# Patient Record
Sex: FEMALE | Race: WHITE | NOT HISPANIC OR LATINO | Employment: OTHER | URBAN - METROPOLITAN AREA
[De-identification: names, ages, dates, MRNs, and addresses within clinical notes are randomized per-mention and may not be internally consistent; named-entity substitution may affect disease eponyms.]

---

## 2017-10-25 ENCOUNTER — APPOINTMENT (OUTPATIENT)
Dept: CARDIOLOGY | Facility: MEDICAL CENTER | Age: 77
End: 2017-10-25

## 2017-11-09 ENCOUNTER — OFFICE VISIT (OUTPATIENT)
Dept: CARDIOLOGY | Facility: MEDICAL CENTER | Age: 77
End: 2017-11-09
Payer: COMMERCIAL

## 2017-11-09 VITALS
SYSTOLIC BLOOD PRESSURE: 110 MMHG | OXYGEN SATURATION: 98 % | WEIGHT: 164 LBS | DIASTOLIC BLOOD PRESSURE: 80 MMHG | HEART RATE: 60 BPM | BODY MASS INDEX: 26.36 KG/M2 | HEIGHT: 66 IN

## 2017-11-09 DIAGNOSIS — R94.31 ABNORMAL ELECTROCARDIOGRAM: Chronic | ICD-10-CM

## 2017-11-09 DIAGNOSIS — I10 ESSENTIAL HYPERTENSION, BENIGN: ICD-10-CM

## 2017-11-09 DIAGNOSIS — E78.5 DYSLIPIDEMIA: ICD-10-CM

## 2017-11-09 PROCEDURE — 99214 OFFICE O/P EST MOD 30 MIN: CPT | Performed by: INTERNAL MEDICINE

## 2017-11-09 NOTE — LETTER
[facility]   [address]  [place] [address]  Phone: [phone]  Fax: [phone]              Amber Roach  1940    Encounter Date: 11/9/2017    Wellington Mistry M.D.          PROGRESS NOTE:  Subjective:   Amber Roach is a 76 y.o. female who presents today For follow-up of her history of hypertension and dyslipidemia    She's been doing well overall, no specific health concerns    Past Medical History:   Diagnosis Date   • Abnormal blood chemistry 2/8/2012   • Abnormal electrocardiogram 2/8/2012   • Breast cancer (CMS-HCC) 2/8/2012   • Hyperlipidemia 2/8/2012   • Hypertension 2/8/2012   • Vitamin d deficiency 2/8/2012     Past Surgical History:   Procedure Laterality Date   • OTHER      appendectomy 1952 right knee replacement 1995 liver biopsy 2013 hysterectomy 1993 left knee replacement 1997 breast lumpectomy 1994 cholecystectomy 2013     Family History   Problem Relation Age of Onset   • Heart Disease Brother    • Heart Attack Brother      History   Smoking Status   • Never Smoker   Smokeless Tobacco   • Never Used     Allergies   Allergen Reactions   • Amlodipine      hives   • Ampicillin    • Penicillins      Outpatient Encounter Prescriptions as of 11/9/2017   Medication Sig Dispense Refill   • Omega-3 Fatty Acids (FISH OIL CONCENTRATE PO) Take  by mouth.     • valsartan-hydrochlorothiazide (DIOVAN-HCT) 320-12.5 MG per tablet Take 1 Tab by mouth every day.     • Calcium Carbonate-Vitamin D (CALCIUM 500 + D PO) Take  by mouth.     • FOLIC ACID PO Take  by mouth.     • Multiple Vitamins-Minerals (PRESERVISION AREDS 2 PO) Take  by mouth.     • aspirin 81 MG tablet Take 81 mg by mouth every day.     • Calcium Citrate-Vitamin D (CITRACAL + D PO) Take  by mouth every day.     • atorvastatin (LIPITOR) 20 MG TABS Take 20 mg by mouth every day.     • Multiple Vitamin (MULTI-VITAMIN) TABS Take  by mouth every day.     • esomeprazole (NEXIUM)  "40 MG capsule Take 40 mg by mouth every day.     • Cholecalciferol (VITAMIN D) 1000 UNIT CAPS Take  by mouth every day.     • ursodiol (ACTIGALL) 300 MG Cap Take 300 mg by mouth 2 times a day.     • Nebivolol HCl (BYSTOLIC) 20 MG Tab Take 20 mg by mouth every day. 30 Tab 11     No facility-administered encounter medications on file as of 11/9/2017.      Review of Systems   Constitutional: Negative for chills and fever.   HENT: Negative for sore throat.    Eyes: Negative for blurred vision.   Respiratory: Negative for cough and shortness of breath.    Cardiovascular: Negative for chest pain, palpitations, claudication, leg swelling and PND.   Gastrointestinal: Negative for abdominal pain and nausea.   Musculoskeletal: Negative for falls and joint pain.   Skin: Negative for rash.   Neurological: Negative for dizziness, focal weakness and weakness.   Endo/Heme/Allergies: Does not bruise/bleed easily.        Objective:   /80   Pulse 60   Ht 1.664 m (5' 5.51\")   Wt 74.4 kg (164 lb)   SpO2 98%   BMI 26.87 kg/m²      Physical Exam   Constitutional: No distress.   HENT:   Mouth/Throat: Oropharynx is clear and moist.   Eyes: No scleral icterus.   Cardiovascular: Normal rate, regular rhythm and intact distal pulses.  Exam reveals no gallop and no friction rub.    No murmur heard.  Pulmonary/Chest: Effort normal and breath sounds normal. She has no rales.   Abdominal: Bowel sounds are normal. There is no tenderness.   Musculoskeletal: She exhibits no edema.   Neurological: She is alert.   Skin: No rash noted. She is not diaphoretic.   Psychiatric: She has a normal mood and affect.       Assessment:     1. Abnormal electrocardiogram     2. Dyslipidemia     3. Essential hypertension, benign         Medical Decision Making:  Today's Assessment / Status / Plan:     It was my pleasure to meet with Ms. Roach.    She is on proper medicines for her hypertension which is at goal and dyslipidemia again at goal    I will see " Ms. Roach back in 1 year time and encouraged her to follow up with us over the phone or e-mail using my MyChart as issues arise.    It is my pleasure to participate in the care of Ms. Roach.  Please do not hesitate to contact me with questions or concerns.    Wellington Mistry MD PhD Located within [facility]  Cardiologist [facility] for Heart and Vascular Health        Marilyn Nagel M.D.  [address]  [id number]  [facility] [address]  VIA Facsimile: [phone]

## 2017-11-10 ASSESSMENT — ENCOUNTER SYMPTOMS
DIZZINESS: 0
FEVER: 0
WEAKNESS: 0
CLAUDICATION: 0
SORE THROAT: 0
COUGH: 0
ABDOMINAL PAIN: 0
NAUSEA: 0
CHILLS: 0
PALPITATIONS: 0
SHORTNESS OF BREATH: 0
BRUISES/BLEEDS EASILY: 0
BLURRED VISION: 0
FALLS: 0
PND: 0
FOCAL WEAKNESS: 0

## 2017-11-11 NOTE — PROGRESS NOTES
Subjective:   Amber Roach is a 76 y.o. female who presents today For follow-up of her history of hypertension and dyslipidemia    She's been doing well overall, no specific health concerns    Past Medical History:   Diagnosis Date   • Abnormal blood chemistry 2/8/2012   • Abnormal electrocardiogram 2/8/2012   • Breast cancer (CMS-HCC) 2/8/2012   • Hyperlipidemia 2/8/2012   • Hypertension 2/8/2012   • Vitamin d deficiency 2/8/2012     Past Surgical History:   Procedure Laterality Date   • OTHER      appendectomy 1952 right knee replacement 1995 liver biopsy 2013 hysterectomy 1993 left knee replacement 1997 breast lumpectomy 1994 cholecystectomy 2013     Family History   Problem Relation Age of Onset   • Heart Disease Brother    • Heart Attack Brother      History   Smoking Status   • Never Smoker   Smokeless Tobacco   • Never Used     Allergies   Allergen Reactions   • Amlodipine      hives   • Ampicillin    • Penicillins      Outpatient Encounter Prescriptions as of 11/9/2017   Medication Sig Dispense Refill   • Omega-3 Fatty Acids (FISH OIL CONCENTRATE PO) Take  by mouth.     • valsartan-hydrochlorothiazide (DIOVAN-HCT) 320-12.5 MG per tablet Take 1 Tab by mouth every day.     • Calcium Carbonate-Vitamin D (CALCIUM 500 + D PO) Take  by mouth.     • FOLIC ACID PO Take  by mouth.     • Multiple Vitamins-Minerals (PRESERVISION AREDS 2 PO) Take  by mouth.     • aspirin 81 MG tablet Take 81 mg by mouth every day.     • Calcium Citrate-Vitamin D (CITRACAL + D PO) Take  by mouth every day.     • atorvastatin (LIPITOR) 20 MG TABS Take 20 mg by mouth every day.     • Multiple Vitamin (MULTI-VITAMIN) TABS Take  by mouth every day.     • esomeprazole (NEXIUM) 40 MG capsule Take 40 mg by mouth every day.     • Cholecalciferol (VITAMIN D) 1000 UNIT CAPS Take  by mouth every day.     • ursodiol (ACTIGALL) 300 MG Cap Take 300 mg by mouth 2 times a day.     • Nebivolol HCl (BYSTOLIC) 20 MG Tab Take 20 mg by mouth every day. 30  "Tab 11     No facility-administered encounter medications on file as of 11/9/2017.      Review of Systems   Constitutional: Negative for chills and fever.   HENT: Negative for sore throat.    Eyes: Negative for blurred vision.   Respiratory: Negative for cough and shortness of breath.    Cardiovascular: Negative for chest pain, palpitations, claudication, leg swelling and PND.   Gastrointestinal: Negative for abdominal pain and nausea.   Musculoskeletal: Negative for falls and joint pain.   Skin: Negative for rash.   Neurological: Negative for dizziness, focal weakness and weakness.   Endo/Heme/Allergies: Does not bruise/bleed easily.        Objective:   /80   Pulse 60   Ht 1.664 m (5' 5.51\")   Wt 74.4 kg (164 lb)   SpO2 98%   BMI 26.87 kg/m²     Physical Exam   Constitutional: No distress.   HENT:   Mouth/Throat: Oropharynx is clear and moist.   Eyes: No scleral icterus.   Cardiovascular: Normal rate, regular rhythm and intact distal pulses.  Exam reveals no gallop and no friction rub.    No murmur heard.  Pulmonary/Chest: Effort normal and breath sounds normal. She has no rales.   Abdominal: Bowel sounds are normal. There is no tenderness.   Musculoskeletal: She exhibits no edema.   Neurological: She is alert.   Skin: No rash noted. She is not diaphoretic.   Psychiatric: She has a normal mood and affect.       Assessment:     1. Abnormal electrocardiogram     2. Dyslipidemia     3. Essential hypertension, benign         Medical Decision Making:  Today's Assessment / Status / Plan:     It was my pleasure to meet with Ms. Roach.    She is on proper medicines for her hypertension which is at goal and dyslipidemia again at goal    I will see Ms. Roach back in 1 year time and encouraged her to follow up with us over the phone or e-mail using my MyChart as issues arise.    It is my pleasure to participate in the care of Ms. Roach.  Please do not hesitate to contact me with questions or " concerns.    Wellington Mistry MD PhD [facility]  Cardiologist [facility] for Heart and Vascular Health

## 2018-12-17 ENCOUNTER — OFFICE VISIT (OUTPATIENT)
Dept: CARDIOLOGY | Facility: MEDICAL CENTER | Age: 78
End: 2018-12-17
Payer: COMMERCIAL

## 2018-12-17 VITALS
OXYGEN SATURATION: 95 % | HEART RATE: 74 BPM | BODY MASS INDEX: 25.71 KG/M2 | DIASTOLIC BLOOD PRESSURE: 70 MMHG | HEIGHT: 66 IN | WEIGHT: 160 LBS | SYSTOLIC BLOOD PRESSURE: 122 MMHG

## 2018-12-17 DIAGNOSIS — E78.5 DYSLIPIDEMIA: ICD-10-CM

## 2018-12-17 DIAGNOSIS — I10 ESSENTIAL HYPERTENSION, BENIGN: ICD-10-CM

## 2018-12-17 PROCEDURE — 99214 OFFICE O/P EST MOD 30 MIN: CPT | Performed by: INTERNAL MEDICINE

## 2018-12-17 RX ORDER — CARVEDILOL PHOSPHATE 40 MG/1
40 CAPSULE, EXTENDED RELEASE ORAL DAILY
Qty: 30 CAP | Refills: 11 | Status: SHIPPED | OUTPATIENT
Start: 2018-12-17 | End: 2021-01-28

## 2018-12-17 RX ORDER — CARVEDILOL 12.5 MG/1
12.5 TABLET ORAL 2 TIMES DAILY WITH MEALS
Refills: 5 | COMMUNITY
Start: 2018-12-14 | End: 2021-05-06 | Stop reason: SDUPTHER

## 2018-12-17 RX ORDER — HYDRALAZINE HYDROCHLORIDE 25 MG/1
25 TABLET, FILM COATED ORAL 3 TIMES DAILY PRN
Qty: 30 TAB | Refills: 11 | Status: SHIPPED | OUTPATIENT
Start: 2018-12-17 | End: 2021-01-28

## 2018-12-17 ASSESSMENT — ENCOUNTER SYMPTOMS
DIZZINESS: 0
FEVER: 0
FALLS: 0
ABDOMINAL PAIN: 0
COUGH: 0
NAUSEA: 0
BRUISES/BLEEDS EASILY: 0
SORE THROAT: 0
WEAKNESS: 0
FOCAL WEAKNESS: 0
SHORTNESS OF BREATH: 0
PALPITATIONS: 0
CLAUDICATION: 0
PND: 0
BLURRED VISION: 0
CHILLS: 0

## 2018-12-17 NOTE — PROGRESS NOTES
Chief Complaint   Patient presents with   • HTN (Controlled)       Subjective:   Amber Roach is a 78 y.o. female who presents today for follow-up of her history of hypertension dyslipidemia    She has had some struggles this year with her blood pressure she knows has high blood pressure when she gets dizziness so she has done better with carvedilol rather than bystolic.          Past Medical History:   Diagnosis Date   • Abnormal blood chemistry 2/8/2012   • Abnormal electrocardiogram 2/8/2012   • Breast cancer (HCC) 2/8/2012   • Hyperlipidemia 2/8/2012   • Hypertension 2/8/2012   • Vitamin d deficiency 2/8/2012     Past Surgical History:   Procedure Laterality Date   • OTHER      appendectomy 1952 right knee replacement 1995 liver biopsy 2013 hysterectomy 1993 left knee replacement 1997 breast lumpectomy 1994 cholecystectomy 2013     Family History   Problem Relation Age of Onset   • Heart Disease Brother    • Heart Attack Brother      Social History     Social History   • Marital status:      Spouse name: N/A   • Number of children: N/A   • Years of education: N/A     Occupational History   • Not on file.     Social History Main Topics   • Smoking status: Never Smoker   • Smokeless tobacco: Never Used   • Alcohol use Yes   • Drug use: No   • Sexual activity: Not on file     Other Topics Concern   • Not on file     Social History Narrative   • No narrative on file     Allergies   Allergen Reactions   • Amlodipine      hives   • Ampicillin    • Penicillins      Outpatient Encounter Prescriptions as of 12/17/2018   Medication Sig Dispense Refill   • carvedilol (COREG) 12.5 MG Tab 12.5 mg 2 times a day, with meals. TAKE 1 TABLET BY MOUTH TWICE A DAY  5   • Carvedilol Phosphate 40 MG CAPSULE SR 24 HR Take 1 Cap by mouth every day. 30 Cap 11   • hydrALAZINE (APRESOLINE) 25 MG Tab Take 1 Tab by mouth 3 times a day as needed (SBP >170). 30 Tab 11   • Omega-3 Fatty Acids (FISH OIL CONCENTRATE PO) Take  by mouth.   "   • valsartan-hydrochlorothiazide (DIOVAN-HCT) 320-12.5 MG per tablet Take 1 Tab by mouth every day.     • FOLIC ACID PO Take  by mouth.     • Multiple Vitamins-Minerals (PRESERVISION AREDS 2 PO) Take  by mouth.     • aspirin 81 MG tablet Take 81 mg by mouth every day.     • Calcium Citrate-Vitamin D (CITRACAL + D PO) Take  by mouth every day.     • atorvastatin (LIPITOR) 20 MG TABS Take 20 mg by mouth every day.     • Multiple Vitamin (MULTI-VITAMIN) TABS Take  by mouth every day.     • esomeprazole (NEXIUM) 40 MG capsule Take 40 mg by mouth every day.     • Cholecalciferol (VITAMIN D) 1000 UNIT CAPS Take  by mouth every day.     • ursodiol (ACTIGALL) 300 MG Cap Take 300 mg by mouth 2 times a day.     • Calcium Carbonate-Vitamin D (CALCIUM 500 + D PO) Take  by mouth.     • [DISCONTINUED] Nebivolol HCl (BYSTOLIC) 20 MG Tab Take 20 mg by mouth every day. 30 Tab 11     No facility-administered encounter medications on file as of 12/17/2018.      Review of Systems   Constitutional: Negative for chills and fever.   HENT: Negative for sore throat.    Eyes: Negative for blurred vision.   Respiratory: Negative for cough and shortness of breath.    Cardiovascular: Negative for chest pain, palpitations, claudication, leg swelling and PND.   Gastrointestinal: Negative for abdominal pain and nausea.   Musculoskeletal: Negative for falls and joint pain.   Skin: Negative for rash.   Neurological: Negative for dizziness, focal weakness and weakness.   Endo/Heme/Allergies: Does not bruise/bleed easily.        Objective:   /70 (BP Location: Left arm, Patient Position: Sitting)   Pulse 74   Ht 1.664 m (5' 5.5\")   Wt 72.6 kg (160 lb)   SpO2 95%   BMI 26.22 kg/m²     Physical Exam   Constitutional: No distress.   HENT:   Mouth/Throat: Oropharynx is clear and moist. No oropharyngeal exudate.   Eyes: No scleral icterus.   Neck: No JVD present.   Cardiovascular: Normal rate and normal heart sounds.  Exam reveals no gallop and " no friction rub.    No murmur heard.  Pulmonary/Chest: No respiratory distress. She has no wheezes. She has no rales.   Abdominal: Soft. Bowel sounds are normal.   Musculoskeletal: She exhibits no edema.   Neurological: She is alert.   Skin: No rash noted. She is not diaphoretic.   Psychiatric: She has a normal mood and affect.     Reviewed her labs from [facility] which show well-controlled dyslipidemia LDL was 83 HDL 85 chemistry otherwise normal except for sodium 133  Assessment:     1. Dyslipidemia     2. Essential hypertension, benign         Medical Decision Making:  Today's Assessment / Status / Plan:     It was my pleasure to meet with Ms. Roach.    It was my pleasure to meet with Ms. Roach.    I gave her a prescription for carvedilol CR 40 mg she prefer daily dosing hopefully is covered by the insurance    Gave her a prescription for hydralazine as needed for elevated blood pressures this hopefully provides reassurance    She is on appropriate statin.      I will see Ms. Roach back in 1 year time and encouraged her to follow up with us over the phone or e-mail using my MyChart as issues arise.    It is my pleasure to participate in the care of Ms. Roach.  Please do not hesitate to contact me with questions or concerns.    Wellington Mistry MD PhD FAC  Cardiologist [facility] for Heart and Vascular Health

## 2018-12-17 NOTE — LETTER
[facility]   [address]   [address]   [place] [address]  Phone: [phone]  Fax: [phone]              Amber Roach  1940    Encounter Date: 12/17/2018    Wellington Mistry M.D.          PROGRESS NOTE:  Chief Complaint   Patient presents with   • HTN (Controlled)       Subjective:   Amber Roach is a 78 y.o. female who presents today for follow-up of her history of hypertension dyslipidemia    She has had some struggles this year with her blood pressure she knows has high blood pressure when she gets dizziness so she has done better with carvedilol rather than bystolic.          Past Medical History:   Diagnosis Date   • Abnormal blood chemistry 2/8/2012   • Abnormal electrocardiogram 2/8/2012   • Breast cancer (HCC) 2/8/2012   • Hyperlipidemia 2/8/2012   • Hypertension 2/8/2012   • Vitamin d deficiency 2/8/2012     Past Surgical History:   Procedure Laterality Date   • OTHER      appendectomy 1952 right knee replacement 1995 liver biopsy 2013 hysterectomy 1993 left knee replacement 1997 breast lumpectomy 1994 cholecystectomy 2013     Family History   Problem Relation Age of Onset   • Heart Disease Brother    • Heart Attack Brother      Social History     Social History   • Marital status:      Spouse name: N/A   • Number of children: N/A   • Years of education: N/A     Occupational History   • Not on file.     Social History Main Topics   • Smoking status: Never Smoker   • Smokeless tobacco: Never Used   • Alcohol use Yes   • Drug use: No   • Sexual activity: Not on file     Other Topics Concern   • Not on file     Social History Narrative   • No narrative on file     Allergies   Allergen Reactions   • Amlodipine      hives   • Ampicillin    • Penicillins      Outpatient Encounter Prescriptions as of 12/17/2018   Medication Sig Dispense Refill   • carvedilol (COREG) 12.5 MG Tab 12.5 mg 2 times a day, with meals.  "TAKE 1 TABLET BY MOUTH TWICE A DAY  5   • Carvedilol Phosphate 40 MG CAPSULE SR 24 HR Take 1 Cap by mouth every day. 30 Cap 11   • hydrALAZINE (APRESOLINE) 25 MG Tab Take 1 Tab by mouth 3 times a day as needed (SBP >170). 30 Tab 11   • Omega-3 Fatty Acids (FISH OIL CONCENTRATE PO) Take  by mouth.     • valsartan-hydrochlorothiazide (DIOVAN-HCT) 320-12.5 MG per tablet Take 1 Tab by mouth every day.     • FOLIC ACID PO Take  by mouth.     • Multiple Vitamins-Minerals (PRESERVISION AREDS 2 PO) Take  by mouth.     • aspirin 81 MG tablet Take 81 mg by mouth every day.     • Calcium Citrate-Vitamin D (CITRACAL + D PO) Take  by mouth every day.     • atorvastatin (LIPITOR) 20 MG TABS Take 20 mg by mouth every day.     • Multiple Vitamin (MULTI-VITAMIN) TABS Take  by mouth every day.     • esomeprazole (NEXIUM) 40 MG capsule Take 40 mg by mouth every day.     • Cholecalciferol (VITAMIN D) 1000 UNIT CAPS Take  by mouth every day.     • ursodiol (ACTIGALL) 300 MG Cap Take 300 mg by mouth 2 times a day.     • Calcium Carbonate-Vitamin D (CALCIUM 500 + D PO) Take  by mouth.     • [DISCONTINUED] Nebivolol HCl (BYSTOLIC) 20 MG Tab Take 20 mg by mouth every day. 30 Tab 11     No facility-administered encounter medications on file as of 12/17/2018.      Review of Systems   Constitutional: Negative for chills and fever.   HENT: Negative for sore throat.    Eyes: Negative for blurred vision.   Respiratory: Negative for cough and shortness of breath.    Cardiovascular: Negative for chest pain, palpitations, claudication, leg swelling and PND.   Gastrointestinal: Negative for abdominal pain and nausea.   Musculoskeletal: Negative for falls and joint pain.   Skin: Negative for rash.   Neurological: Negative for dizziness, focal weakness and weakness.   Endo/Heme/Allergies: Does not bruise/bleed easily.        Objective:   /70 (BP Location: Left arm, Patient Position: Sitting)   Pulse 74   Ht 1.664 m (5' 5.5\")   Wt 72.6 kg (160 " lb)   SpO2 95%   BMI 26.22 kg/m²      Physical Exam   Constitutional: No distress.   HENT:   Mouth/Throat: Oropharynx is clear and moist. No oropharyngeal exudate.   Eyes: No scleral icterus.   Neck: No JVD present.   Cardiovascular: Normal rate and normal heart sounds.  Exam reveals no gallop and no friction rub.    No murmur heard.  Pulmonary/Chest: No respiratory distress. She has no wheezes. She has no rales.   Abdominal: Soft. Bowel sounds are normal.   Musculoskeletal: She exhibits no edema.   Neurological: She is alert.   Skin: No rash noted. She is not diaphoretic.   Psychiatric: She has a normal mood and affect.     Reviewed her labs from [facility] which show well-controlled dyslipidemia LDL was 83 HDL 85 chemistry otherwise normal except for sodium 133  Assessment:     1. Dyslipidemia     2. Essential hypertension, benign         Medical Decision Making:  Today's Assessment / Status / Plan:     It was my pleasure to meet with Ms. Roach.    It was my pleasure to meet with Ms. Roach.    I gave her a prescription for carvedilol CR 40 mg she prefer daily dosing hopefully is covered by the insurance    Gave her a prescription for hydralazine as needed for elevated blood pressures this hopefully provides reassurance    She is on appropriate statin.      I will see Ms. Roach back in 1 year time and encouraged her to follow up with us over the phone or e-mail using my MyChart as issues arise.    It is my pleasure to participate in the care of Ms. Roach.  Please do not hesitate to contact me with questions or concerns.    Wellington Mistry MD PhD FAC  Cardiologist [facility] for Heart and Vascular Health        Marilyn Nagel M.D.  [address]  [address] [address]  VIA Facsimile: [phone]

## 2021-01-15 DIAGNOSIS — Z23 NEED FOR VACCINATION: ICD-10-CM

## 2021-01-28 ENCOUNTER — OFFICE VISIT (OUTPATIENT)
Dept: MEDICAL GROUP | Facility: PHYSICIAN GROUP | Age: 81
End: 2021-01-28
Payer: COMMERCIAL

## 2021-01-28 VITALS
DIASTOLIC BLOOD PRESSURE: 82 MMHG | OXYGEN SATURATION: 96 % | HEART RATE: 72 BPM | SYSTOLIC BLOOD PRESSURE: 118 MMHG | RESPIRATION RATE: 16 BRPM | TEMPERATURE: 98.1 F | WEIGHT: 161.8 LBS | HEIGHT: 64 IN | BODY MASS INDEX: 27.62 KG/M2

## 2021-01-28 DIAGNOSIS — E78.5 DYSLIPIDEMIA: ICD-10-CM

## 2021-01-28 DIAGNOSIS — N18.30 CHRONIC RENAL FAILURE IN PEDIATRIC PATIENT, STAGE 3 (MODERATE): ICD-10-CM

## 2021-01-28 DIAGNOSIS — Z85.3 HISTORY OF RIGHT BREAST CANCER: ICD-10-CM

## 2021-01-28 DIAGNOSIS — I10 ESSENTIAL HYPERTENSION, BENIGN: ICD-10-CM

## 2021-01-28 DIAGNOSIS — E55.9 VITAMIN D DEFICIENCY: ICD-10-CM

## 2021-01-28 DIAGNOSIS — E87.1 HYPONATREMIA: ICD-10-CM

## 2021-01-28 PROCEDURE — 99203 OFFICE O/P NEW LOW 30 MIN: CPT | Performed by: FAMILY MEDICINE

## 2021-01-28 RX ORDER — ETODOLAC 400 MG/1
400 TABLET, FILM COATED ORAL
COMMUNITY
Start: 2020-12-30 | End: 2022-08-23

## 2021-01-28 RX ORDER — TRAMADOL HYDROCHLORIDE 50 MG/1
TABLET ORAL
COMMUNITY
Start: 2020-11-24 | End: 2020-12-20

## 2021-01-28 ASSESSMENT — PATIENT HEALTH QUESTIONNAIRE - PHQ9: CLINICAL INTERPRETATION OF PHQ2 SCORE: 0

## 2021-01-28 NOTE — ASSESSMENT & PLAN NOTE
This is a chronic resolved problem.  Patient had a breast lumpectomy January 1994.  She has had no recurrence of disease.

## 2021-01-28 NOTE — ASSESSMENT & PLAN NOTE
This is a chronic problem.  Patient is on supplementation.  Last vitamin D level October 2020 was normal at 31.

## 2021-01-28 NOTE — ASSESSMENT & PLAN NOTE
This is a chronic problem.  Patient is on Lipitor and tolerating it well.  She is tried going off of it in the past but her lipids go back up.  She has no symptoms of coronary artery disease.  She has had some screening this suggested mild stenosis of the carotid arteries but that has not changed.  Last cholesterol panel done October 20 showed cholesterol of 180, LDL of 74, HDL of 86 and triglycerides of 99.

## 2021-01-28 NOTE — PROGRESS NOTES
Subjective:     CC: Here to establish care.  She states she feels very well on today's visit.  HPI:   Amber presents today with the following medical issues:    Hyponatremia  This is a chronic problem.  Patient has had mild hyponatremia over the last 5 to 8 years.    Dyslipidemia  This is a chronic problem.  Patient is on Lipitor and tolerating it well.  She is tried going off of it in the past but her lipids go back up.  She has no symptoms of coronary artery disease.  She has had some screening this suggested mild stenosis of the carotid arteries but that has not changed.  Last cholesterol panel done October 20 showed cholesterol of 180, LDL of 74, HDL of 86 and triglycerides of 99.    Essential hypertension, benign  This is a chronic problem.  Patient's blood pressures well controlled on current medications.    Vitamin D deficiency  This is a chronic problem.  Patient is on supplementation.  Last vitamin D level October 2020 was normal at 31.    History of right breast cancer  This is a chronic resolved problem.  Patient had a breast lumpectomy January 1994.  She has had no recurrence of disease.    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  Patient's last creatinine was slightly elevated 1.09 and GFR was decreased to 48.  Patient knows to avoid nephrotoxic drugs.  Although she was given Lodine from her orthopedist to use for her hip pain following surgery she uses it only very sparingly.  She was told she could try over-the-counter diclofenac gel and use Tylenol.  We will continue to monitor this.      Past Medical History:   Diagnosis Date   • Abnormal blood chemistry 2/8/2012   • Abnormal electrocardiogram 2/8/2012   • Breast cancer (HCC) 2/8/2012   • Hyperlipidemia 2/8/2012   • Hypertension 2/8/2012   • Macular degeneration of right eye 2017   • Vitamin d deficiency 2/8/2012       Social History     Tobacco Use   • Smoking status: Never Smoker   • Smokeless  "tobacco: Never Used   Substance Use Topics   • Alcohol use: Yes     Alcohol/week: 0.6 oz     Types: 1 Glasses of wine per week     Comment: daily   • Drug use: No       Current Outpatient Medications Ordered in Epic   Medication Sig Dispense Refill   • etodolac (LODINE) 400 MG tablet Take 400 mg by mouth as needed.     • carvedilol (COREG) 12.5 MG Tab 12.5 mg 2 times a day, with meals. TAKE 1 TABLET BY MOUTH TWICE A DAY  5   • Omega-3 Fatty Acids (FISH OIL CONCENTRATE PO) Take  by mouth.     • valsartan-hydrochlorothiazide (DIOVAN-HCT) 320-12.5 MG per tablet Take 1 Tab by mouth every day.     • Calcium Carbonate-Vitamin D (CALCIUM 500 + D PO) Take  by mouth.     • Multiple Vitamins-Minerals (PRESERVISION AREDS 2 PO) Take  by mouth.     • aspirin 81 MG tablet Take 81 mg by mouth every day.     • atorvastatin (LIPITOR) 20 MG TABS Take 20 mg by mouth every day.     • Multiple Vitamin (MULTI-VITAMIN) TABS Take  by mouth every day.     • esomeprazole (NEXIUM) 40 MG capsule Take 40 mg by mouth every day.     • Cholecalciferol (VITAMIN D) 1000 UNIT CAPS Take  by mouth every day.     • ursodiol (ACTIGALL) 300 MG Cap Take 300 mg by mouth 2 times a day.       No current [facility]-ordered facility-administered medications on file.        Allergies:  Amlodipine, Ampicillin, and Penicillins    Health Maintenance: Completed    ROS:  Gen: no fevers/chills, no changes in weight  Eyes: no changes in vision  ENT: no sore throat, no hearing loss, no bloody nose  Pulm: no sob, no cough  CV: no chest pain, no palpitations  GI: no nausea/vomiting, no diarrhea  : no dysuria  MSk: no myalgias  Skin: no rash  Neuro: no headaches, no numbness/tingling  Heme/Lymph: no easy bruising      Objective:       Exam:  /82 (BP Location: Left arm, Patient Position: Sitting, BP Cuff Size: Adult)   Pulse 72   Temp 36.7 °C (98.1 °F) (Temporal)   Resp 16   Ht 1.613 m (5' 3.5\")   Wt 73.4 kg (161 lb 12.8 oz)   SpO2 96%   BMI 28.21 kg/m²  Body mass " index is 28.21 kg/m².    Gen: Alert and oriented, No apparent distress.  Neck: Neck is supple without lymphadenopathy.  Lungs: Normal effort, CTA bilaterally, no wheezes, rhonchi, or rales  CV: Regular rate and rhythm. No murmurs, rubs, or gallops.  Ext: No clubbing, cyanosis, edema.        Labs: Patient brought in copies of labs done October 2020 which were reviewed.  Her sodium was slightly low at 131 and alk phos was high at 140.  Her creatinine was 1.09.  And her GFR was slightly low at 48.  Lipids were within normal limits.  Sugar was normal.  CBC was normal.    Assessment & Plan:     80 y.o. female with the following -     1. History of right breast cancer  This is a chronic problem that is resolved.  Continue to follow.  Patient had recent mammogram that was normal.    2. Vitamin D deficiency  This is a chronic problem.  Continue to follow.  Patient is on supplementation.    3. Hyponatremia  This is a chronic problem.  Patient was told as long as her sodium does not drop any lower we do not need to do any work-up.  She was told is okay to have a little salt in her diet.    4. Dyslipidemia  This is a chronic problem.  Continue on her statin.    5. Essential hypertension, benign  This is a chronic problem.  Continue current medications.   6.  Chronic renal failure  This is a chronic problem.  We will continue to follow.    Return in about 1 year (around 1/28/2022) for f/view labs.    Please note that this dictation was created using voice recognition software. I have made every reasonable attempt to correct obvious errors, but I expect that there are errors of grammar and possibly content that I did not discover before finalizing the note.

## 2021-01-28 NOTE — PROGRESS NOTES
Annual Health Assessment Questions:    1.  Are you currently engaging in any exercise or physical activity? yes    2.  How would you describe your mood or emotional well-being today? good    3.  Have you had any falls in the last year? No    4.  Have you noticed any problems with your balance or had difficulty walking? No    5.  In the last six months have you experienced any leakage of urine? No    6. DPA/Advanced Directive: Patient has Advanced Directive, but it is not on file. Instructed to bring in a copy to scan into their chart.

## 2021-01-28 NOTE — ASSESSMENT & PLAN NOTE
This is a chronic problem.  Patient's last creatinine was slightly elevated 1.09 and GFR was decreased to 48.  Patient knows to avoid nephrotoxic drugs.  Although she was given Lodine from her orthopedist to use for her hip pain following surgery she uses it only very sparingly.  She was told she could try over-the-counter diclofenac gel and use Tylenol.  We will continue to monitor this.

## 2021-05-06 ENCOUNTER — OFFICE VISIT (OUTPATIENT)
Dept: MEDICAL GROUP | Facility: PHYSICIAN GROUP | Age: 81
End: 2021-05-06
Payer: COMMERCIAL

## 2021-05-06 VITALS
TEMPERATURE: 98.3 F | WEIGHT: 159.8 LBS | SYSTOLIC BLOOD PRESSURE: 134 MMHG | BODY MASS INDEX: 27.28 KG/M2 | OXYGEN SATURATION: 96 % | HEART RATE: 74 BPM | DIASTOLIC BLOOD PRESSURE: 82 MMHG | HEIGHT: 64 IN | RESPIRATION RATE: 16 BRPM

## 2021-05-06 DIAGNOSIS — I10 ESSENTIAL HYPERTENSION, BENIGN: ICD-10-CM

## 2021-05-06 PROCEDURE — 99214 OFFICE O/P EST MOD 30 MIN: CPT | Performed by: FAMILY MEDICINE

## 2021-05-06 RX ORDER — VALSARTAN AND HYDROCHLOROTHIAZIDE 320; 12.5 MG/1; MG/1
1 TABLET, FILM COATED ORAL DAILY
Qty: 100 TABLET | Refills: 3 | Status: SHIPPED | OUTPATIENT
Start: 2021-05-06 | End: 2022-03-28 | Stop reason: SDUPTHER

## 2021-05-06 RX ORDER — CARVEDILOL 12.5 MG/1
TABLET ORAL
Qty: 450 TABLET | Refills: 3 | Status: SHIPPED | OUTPATIENT
Start: 2021-05-06 | End: 2022-04-04 | Stop reason: SDUPTHER

## 2021-05-06 RX ORDER — ESOMEPRAZOLE MAGNESIUM 40 MG/1
40 CAPSULE, DELAYED RELEASE ORAL DAILY
Qty: 90 CAPSULE | Refills: 3 | Status: SHIPPED | OUTPATIENT
Start: 2021-05-06 | End: 2022-03-28 | Stop reason: SDUPTHER

## 2021-05-06 RX ORDER — ATORVASTATIN CALCIUM 20 MG/1
20 TABLET, FILM COATED ORAL DAILY
Qty: 100 TABLET | Refills: 3 | Status: SHIPPED | OUTPATIENT
Start: 2021-05-06 | End: 2022-03-28 | Stop reason: SDUPTHER

## 2021-05-06 NOTE — ASSESSMENT & PLAN NOTE
This is a chronic problem.  Patient states the last week or so she has not been feeling very good and she  noted that her blood pressures been going up.  Last night it was 167/88.  She went to the health fair this morning and it was about the same value.  She has been having some stress in her life.  Her   2 years ago when she is trying to get the [employer] ready to be sold.  She has problems with hip pain so this is a very slow agonizing process for her to clean out the RV.

## 2021-05-06 NOTE — PROGRESS NOTES
Subjective:     CC: Here for follow-up on elevated blood pressure.    HPI:   Amber presents today with 1 medical concerns:    Essential hypertension, benign  This is a chronic problem.  Patient states the last week or so she has not been feeling very good and she  noted that her blood pressures been going up.  Last night it was 167/88.  She went to the health fair this morning and it was about the same value.  She has been having some stress in her life.  Her   2 years ago when she is trying to get the [email or web address] ready to be sold.  She has problems with hip pain so this is a very slow agonizing process for her to clean out the RV.      Past Medical History:   Diagnosis Date   • Abnormal blood chemistry 2012   • Abnormal electrocardiogram 2012   • Breast cancer (HCC) 2012   • Hyperlipidemia 2012   • Hypertension 2012   • Macular degeneration of right eye 2017   • Vitamin d deficiency 2012       Social History     Tobacco Use   • Smoking status: Never Smoker   • Smokeless tobacco: Never Used   Substance Use Topics   • Alcohol use: Yes     Alcohol/week: 0.6 oz     Types: 1 Glasses of wine per week     Comment: daily   • Drug use: No       Current Outpatient Medications Ordered in Epic   Medication Sig Dispense Refill   • atorvastatin (LIPITOR) 20 MG Tab Take 1 tablet by mouth every day. 100 tablet 3   • esomeprazole (NEXIUM) 40 MG delayed-release capsule Take 1 capsule by mouth every day. 90 capsule 3   • valsartan-hydrochlorothiazide (DIOVAN-HCT) 320-12.5 MG per tablet Take 1 tablet by mouth every day. 100 tablet 3   • carvedilol (COREG) 12.5 MG Tab Take 3 in morning and 2 in evening 450 tablet 3   • etodolac (LODINE) 400 MG tablet Take 400 mg by mouth as needed.     • Omega-3 Fatty Acids (FISH OIL CONCENTRATE PO) Take  by mouth.     • Calcium Carbonate-Vitamin D (CALCIUM 500 + D PO) Take  by mouth.     • Multiple Vitamins-Minerals (PRESERVISION AREDS 2 PO) Take  by mouth.     •  "aspirin 81 MG tablet Take 81 mg by mouth every day.     • Multiple Vitamin (MULTI-VITAMIN) TABS Take  by mouth every day.     • Cholecalciferol (VITAMIN D) 1000 UNIT CAPS Take  by mouth every day.     • ursodiol (ACTIGALL) 300 MG Cap Take 300 mg by mouth 2 times a day.       No current Epic-ordered facility-administered medications on file.       Allergies:  Amlodipine, Ampicillin, and Penicillins    Health Maintenance: Not addressed today    ROS:  Gen: no fevers/chills, no changes in weight  Eyes: no changes in vision  ENT: no sore throat, no hearing loss, no bloody nose  Pulm: no sob, no cough  CV: no chest pain, no palpitations  GI: no nausea/vomiting, no diarrhea  : no dysuria  MSk: no myalgias  Skin: no rash  Neuro: no headaches, no numbness/tingling  Heme/Lymph: no easy bruising      Objective:       Exam:  /82 (BP Location: Left arm, Patient Position: Sitting, BP Cuff Size: Adult)   Pulse 74   Temp 36.8 °C (98.3 °F) (Temporal)   Resp 16   Ht 1.613 m (5' 3.5\")   Wt 72.5 kg (159 lb 12.8 oz)   SpO2 96%   BMI 27.86 kg/m²  Body mass index is 27.86 kg/m².    Gen: Alert and oriented, No apparent distress.  Ext: No clubbing, cyanosis, edema.  Psych: Patient is alert and oriented x3.  She is a little bit anxious at the start of the visit but calms down as we talked  about her issues.  No unusual thought process expressed.        Assessment & Plan:     80 y.o. female with the following -     1. Essential hypertension, benign  This is a chronic condition.  I reviewed the patient's blood pressure log.  We will go ahead and increase her Coreg to 3 in the morning and 2 at night.  Her other prescriptions were renewed as well.  She is to report back in 1 to 2 weeks with her response to the treatment.  We did discuss red flags for elevated blood pressure as well.  The next time she comes in she also can bring in her machine to correlate with our readings.  2. Stress  We also discussed her stress issues.  This " appears to be an acute problem and hopefully will improve in the near future.  If she has continued troubles she can return to clinic.    33 minutes spent with the patient discussing her issues and reviewing her chart.  Return if symptoms worsen or fail to improve.    Please note that this dictation was created using voice recognition software. I have made every reasonable attempt to correct obvious errors, but I expect that there are errors of grammar and possibly content that I did not discover before finalizing the note.

## 2021-05-08 ENCOUNTER — OFFICE VISIT (OUTPATIENT)
Dept: URGENT CARE | Facility: PHYSICIAN GROUP | Age: 81
End: 2021-05-08
Payer: COMMERCIAL

## 2021-05-08 VITALS
OXYGEN SATURATION: 97 % | BODY MASS INDEX: 27.08 KG/M2 | SYSTOLIC BLOOD PRESSURE: 140 MMHG | HEIGHT: 64 IN | WEIGHT: 158.6 LBS | TEMPERATURE: 97.8 F | HEART RATE: 64 BPM | DIASTOLIC BLOOD PRESSURE: 80 MMHG | RESPIRATION RATE: 18 BRPM

## 2021-05-08 DIAGNOSIS — I10 ESSENTIAL HYPERTENSION, BENIGN: ICD-10-CM

## 2021-05-08 DIAGNOSIS — I10 ELEVATED BLOOD PRESSURE READING IN OFFICE WITH DIAGNOSIS OF HYPERTENSION: ICD-10-CM

## 2021-05-08 PROCEDURE — 99214 OFFICE O/P EST MOD 30 MIN: CPT | Performed by: NURSE PRACTITIONER

## 2021-05-08 ASSESSMENT — ENCOUNTER SYMPTOMS
FEVER: 0
NAUSEA: 0
SHORTNESS OF BREATH: 0
BLURRED VISION: 0
PALPITATIONS: 0

## 2021-05-08 NOTE — PROGRESS NOTES
"Amber Roach is a 80 y.o. female who presents for Blood Pressure Problem (light leaded, dizziness, x1 week.)      HPI  This is a new problem.  Amber is a 81 y/o female with c/o elevated blood pressure x 1 week. Feeling unwell \" I can tell when my blood pressure is elevated\". Takes her BP at home when she feels this way.  It has been 160's/ 80's Was seen by her PCP who told her to increase her Carvedilol to 3 tablets/ day when she feels her pressure is too high. Today she Took 2 of her BP meds today instead of 3 tablets. Then she took the 3rd tablets and is feeling better. She says she worries about her pressure. Was told to come to urgent care prn to have her BP checked. She is here today with her monitor and her log.     Review of Systems   Constitutional: Negative for fever.   Eyes: Negative for blurred vision.   Respiratory: Negative for shortness of breath.    Cardiovascular: Negative for chest pain and palpitations.   Gastrointestinal: Negative for nausea.   Endo/Heme/Allergies: Negative for environmental allergies.       Allergies   Allergen Reactions   • Amlodipine      hives   • Ampicillin    • Penicillins      Past Medical History:   Diagnosis Date   • Abnormal blood chemistry 2/8/2012   • Abnormal electrocardiogram 2/8/2012   • Breast cancer (HCC) 2/8/2012   • Hyperlipidemia 2/8/2012   • Hypertension 2/8/2012   • Macular degeneration of right eye 2017   • Vitamin d deficiency 2/8/2012     Past Surgical History:   Procedure Laterality Date   • ARTHROPLASTY Right 06/2020    right replacement   • CHOLECYSTECTOMY  09/2013   • LIVER BIOPSY  2013    PBC autoimmmune   • LUMPECTOMY Right 01/1994    lumpectomy for cancer   • ABDOMINAL HYSTERECTOMY TOTAL  09/1993   • APPENDECTOMY  1953   • KNEE ARTHROSCOPY Bilateral 1995 and 1996   • OTHER      appendectomy 1952 right knee replacement 1995 liver biopsy 2013 hysterectomy 1993 left knee replacement 1997 breast lumpectomy 1994 cholecystectomy 2013     Family History " "  Problem Relation Age of Onset   • Heart Disease Brother    • Heart Attack Brother      Social History     Tobacco Use   • Smoking status: Never Smoker   • Smokeless tobacco: Never Used   Substance Use Topics   • Alcohol use: Yes     Alcohol/week: 0.6 oz     Types: 1 Glasses of wine per week     Comment: daily     Patient Active Problem List   Diagnosis   • Hyponatremia   • Abnormal electrocardiogram   • Dyslipidemia   • Essential hypertension, benign   • Vitamin D deficiency   • History of right breast cancer   • Chronic renal failure in pediatric patient, stage 3 (moderate)     Current Outpatient Medications on File Prior to Visit   Medication Sig Dispense Refill   • atorvastatin (LIPITOR) 20 MG Tab Take 1 tablet by mouth every day. 100 tablet 3   • esomeprazole (NEXIUM) 40 MG delayed-release capsule Take 1 capsule by mouth every day. 90 capsule 3   • valsartan-hydrochlorothiazide (DIOVAN-HCT) 320-12.5 MG per tablet Take 1 tablet by mouth every day. 100 tablet 3   • carvedilol (COREG) 12.5 MG Tab Take 3 in morning and 2 in evening 450 tablet 3   • etodolac (LODINE) 400 MG tablet Take 400 mg by mouth as needed.     • Omega-3 Fatty Acids (FISH OIL CONCENTRATE PO) Take  by mouth.     • Calcium Carbonate-Vitamin D (CALCIUM 500 + D PO) Take  by mouth.     • Multiple Vitamins-Minerals (PRESERVISION AREDS 2 PO) Take  by mouth.     • aspirin 81 MG tablet Take 81 mg by mouth every day.     • Multiple Vitamin (MULTI-VITAMIN) TABS Take  by mouth every day.     • Cholecalciferol (VITAMIN D) 1000 UNIT CAPS Take  by mouth every day.       No current facility-administered medications on file prior to visit.          Objective:     /80 (BP Location: Left arm, Patient Position: Sitting, BP Cuff Size: Adult)   Pulse 64   Temp 36.6 °C (97.8 °F) (Temporal)   Resp 18   Ht 1.613 m (5' 3.5\")   Wt 71.9 kg (158 lb 9.6 oz)   SpO2 97%   BMI 27.65 kg/m²     Physical Exam  Vitals reviewed.   Constitutional:       Appearance: " Normal appearance. She is normal weight.   Cardiovascular:      Rate and Rhythm: Normal rate.      Pulses: Normal pulses.   Pulmonary:      Breath sounds: Normal breath sounds.   Musculoskeletal:      Right lower leg: No edema.      Left lower leg: No edema.   Skin:     General: Skin is warm.      Capillary Refill: Capillary refill takes less than 2 seconds.   Neurological:      Mental Status: She is alert and oriented to person, place, and time.   Psychiatric:         Mood and Affect: Mood normal.         Thought Content: Thought content normal.       153/86 HR 63  By pt's machine in left arm   140/70 manual  Checked By provider  Left arm     Assessment /Associated Orders:      1. Elevated blood pressure reading in office with diagnosis of hypertension     2. Essential hypertension, benign           Medical Decision Making:    Pt is clinically stable at today's acute urgent care visit.  No acute distress noted. Appropriate for outpatient management at this time.  Her BP machine does appear to be reading higher than a manual BP.   Advised that she bring it to her next appt with PCP and recheck. She just changed batteries.   Resume all prior  RX medications. Take as prescribed.   Stay well hydrated  Educated relaxation techniques when she feels her BP is too high.       Advised to follow-up with the primary care provider for recheck, reevaluation, and consideration of further management if necessary.   Discussed management options (risks,benefits, and alternatives to treatment). Expressed understanding and the treatment plan was agreed upon. Questions were encouraged and answered       Return to urgent care prn if new or worsening sx or if there is no improvement in condition prn . Red flags discussed and indications to immediately call 911 or present to the Emergency Department.     I personally reviewed prior external notes and test results pertinent to today's visit.  I have independently reviewed and interpreted  all diagnostics ordered during this urgent care acute visit.   Was seen recently by her PCP. Pt has good understanding of her BP medication dosages and directions.     Time spent evaluating this patient was at least 31 minutes and includes preparing for visit, counseling/education, exam and evaluation, obtaining history, independent interpretation, ordering lab/test/procedures,medication management and documentation.      Please note that this dictation was created using voice recognition software. I have made every reasonable attempt to correct obvious errors, but I expect that there are errors of grammar and possibly content that I did not discover before finalizing the note.

## 2021-05-11 DIAGNOSIS — R74.8 ELEVATED ALKALINE PHOSPHATASE LEVEL: ICD-10-CM

## 2021-06-21 VITALS — DIASTOLIC BLOOD PRESSURE: 82 MMHG | SYSTOLIC BLOOD PRESSURE: 134 MMHG

## 2021-08-20 ENCOUNTER — TELEPHONE (OUTPATIENT)
Dept: MEDICAL GROUP | Facility: PHYSICIAN GROUP | Age: 81
End: 2021-08-20

## 2021-08-20 RX ORDER — IBANDRONATE SODIUM 150 MG/1
150 TABLET, FILM COATED ORAL
Qty: 3 TABLET | Status: CANCELLED | OUTPATIENT
Start: 2021-08-20

## 2021-08-20 RX ORDER — IBANDRONATE SODIUM 150 MG/1
150 TABLET, FILM COATED ORAL
COMMUNITY
End: 2021-08-20 | Stop reason: SDUPTHER

## 2021-08-20 RX ORDER — IBANDRONATE SODIUM 150 MG/1
150 TABLET, FILM COATED ORAL
Qty: 3 TABLET | Refills: 3 | Status: SHIPPED | OUTPATIENT
Start: 2021-08-20 | End: 2021-08-27 | Stop reason: SDUPTHER

## 2021-08-20 NOTE — TELEPHONE ENCOUNTER
A note received from pt requesting to have her osteoporosis medication-Ibandronate  be added to her med list.

## 2021-08-27 ENCOUNTER — HOSPITAL ENCOUNTER (OUTPATIENT)
Dept: LAB | Facility: MEDICAL CENTER | Age: 81
End: 2021-08-27
Attending: FAMILY MEDICINE
Payer: COMMERCIAL

## 2021-08-27 ENCOUNTER — OFFICE VISIT (OUTPATIENT)
Dept: MEDICAL GROUP | Facility: PHYSICIAN GROUP | Age: 81
End: 2021-08-27
Payer: COMMERCIAL

## 2021-08-27 VITALS
SYSTOLIC BLOOD PRESSURE: 122 MMHG | OXYGEN SATURATION: 97 % | TEMPERATURE: 97.8 F | BODY MASS INDEX: 25.55 KG/M2 | HEART RATE: 78 BPM | HEIGHT: 66 IN | RESPIRATION RATE: 20 BRPM | DIASTOLIC BLOOD PRESSURE: 78 MMHG | WEIGHT: 159 LBS

## 2021-08-27 DIAGNOSIS — R74.8 ELEVATED ALKALINE PHOSPHATASE LEVEL: ICD-10-CM

## 2021-08-27 DIAGNOSIS — M54.50 CHRONIC LEFT-SIDED LOW BACK PAIN WITHOUT SCIATICA: ICD-10-CM

## 2021-08-27 DIAGNOSIS — N18.30 CHRONIC RENAL FAILURE SYNDROME, STAGE 3 (MODERATE): ICD-10-CM

## 2021-08-27 DIAGNOSIS — G89.29 CHRONIC LEFT-SIDED LOW BACK PAIN WITHOUT SCIATICA: ICD-10-CM

## 2021-08-27 DIAGNOSIS — Z12.31 ENCOUNTER FOR SCREENING MAMMOGRAM FOR MALIGNANT NEOPLASM OF BREAST: ICD-10-CM

## 2021-08-27 LAB
ALBUMIN SERPL BCP-MCNC: 4.2 G/DL (ref 3.2–4.9)
BUN SERPL-MCNC: 15 MG/DL (ref 8–22)
CALCIUM SERPL-MCNC: 9.5 MG/DL (ref 8.5–10.5)
CHLORIDE SERPL-SCNC: 95 MMOL/L (ref 96–112)
CO2 SERPL-SCNC: 23 MMOL/L (ref 20–33)
CREAT SERPL-MCNC: 0.88 MG/DL (ref 0.5–1.4)
GLUCOSE SERPL-MCNC: 94 MG/DL (ref 65–99)
PHOSPHATE SERPL-MCNC: 4.1 MG/DL (ref 2.5–4.5)
POTASSIUM SERPL-SCNC: 4.5 MMOL/L (ref 3.6–5.5)
SODIUM SERPL-SCNC: 131 MMOL/L (ref 135–145)

## 2021-08-27 PROCEDURE — 99213 OFFICE O/P EST LOW 20 MIN: CPT | Performed by: FAMILY MEDICINE

## 2021-08-27 PROCEDURE — 36415 COLL VENOUS BLD VENIPUNCTURE: CPT

## 2021-08-27 PROCEDURE — 84075 ASSAY ALKALINE PHOSPHATASE: CPT

## 2021-08-27 PROCEDURE — 80069 RENAL FUNCTION PANEL: CPT

## 2021-08-27 PROCEDURE — 84080 ASSAY ALKALINE PHOSPHATASES: CPT

## 2021-08-27 RX ORDER — IBANDRONATE SODIUM 150 MG/1
150 TABLET, FILM COATED ORAL
Qty: 3 TABLET | Refills: 3 | Status: SHIPPED | OUTPATIENT
Start: 2021-08-27 | End: 2022-07-29 | Stop reason: SDUPTHER

## 2021-08-27 NOTE — PROGRESS NOTES
Subjective:     CC: Initially she made an appointment as she was feeling sick yesterday but she is gotten better today.  She does have several other concerns to go over however.    HPI:   Amber presents today with the following medical concerns:    Chronic left-sided low back pain without sciatica  This is a chronic problem.  Patient states she is scheduled to have an injection to see if it helps with her back pain but she is not sure she really wants to do it.  She states that she takes 1 bare aspirin a day the pain is greatly improved and she is asking if that safe to do.  I reviewed her labs and she does have some chronic renal failure so advised her to stop it until we get her lab test.  Otherwise she can use over-the-counter Tylenol and diclofenac gel.    Elevated alkaline phosphatase level  This is a new problem.  Patient did not do the labs ordered back in May but she will do those today.  We will notify her of the results.    Chronic renal failure syndrome, stage 3 (moderate) (HCC)  This is a chronic problem.  Patient had been on an NSAID couple months ago which she has discontinued.  We will recheck her labs to see if they have improved.      Past Medical History:   Diagnosis Date   • Abnormal blood chemistry 2/8/2012   • Abnormal electrocardiogram 2/8/2012   • Breast cancer (HCC) 2/8/2012   • Hyperlipidemia 2/8/2012   • Hypertension 2/8/2012   • Macular degeneration of right eye 2017   • Vitamin d deficiency 2/8/2012       Social History     Tobacco Use   • Smoking status: Never Smoker   • Smokeless tobacco: Never Used   Vaping Use   • Vaping Use: Never used   Substance Use Topics   • Alcohol use: Yes     Alcohol/week: 0.6 oz     Types: 1 Glasses of wine per week     Comment: daily   • Drug use: No       Current Outpatient Medications Ordered in Epic   Medication Sig Dispense Refill   • ibandronate (BONIVA) 150 MG tablet Take 1 Tablet by mouth every 30 days. 3 Tablet 3   • atorvastatin (LIPITOR) 20 MG Tab  "Take 1 tablet by mouth every day. 100 tablet 3   • esomeprazole (NEXIUM) 40 MG delayed-release capsule Take 1 capsule by mouth every day. 90 capsule 3   • valsartan-hydrochlorothiazide (DIOVAN-HCT) 320-12.5 MG per tablet Take 1 tablet by mouth every day. 100 tablet 3   • carvedilol (COREG) 12.5 MG Tab Take 3 in morning and 2 in evening 450 tablet 3   • etodolac (LODINE) 400 MG tablet Take 400 mg by mouth as needed.     • Omega-3 Fatty Acids (FISH OIL CONCENTRATE PO) Take  by mouth.     • Calcium Carbonate-Vitamin D (CALCIUM 500 + D PO) Take  by mouth.     • Multiple Vitamins-Minerals (PRESERVISION AREDS 2 PO) Take  by mouth.     • aspirin 81 MG tablet Take 81 mg by mouth every day.     • Multiple Vitamin (MULTI-VITAMIN) TABS Take  by mouth every day.     • Cholecalciferol (VITAMIN D) 1000 UNIT CAPS Take  by mouth every day.       No current [facility]-ordered facility-administered medications on file.       Allergies:  Amlodipine, Ampicillin, and Penicillins    Health Maintenance: Completed    ROS:  Gen: no fevers/chills, no changes in weight  Eyes: no changes in vision  ENT: no sore throat, no hearing loss, no bloody nose  Pulm: no sob, no cough  CV: no chest pain, no palpitations  GI: no nausea/vomiting, no diarrhea  : no dysuria  MSk: no myalgias  Skin: no rash  Neuro: no headaches, no numbness/tingling  Heme/Lymph: no easy bruising      Objective:       Exam:  /78 (BP Location: Right arm, Patient Position: Sitting, BP Cuff Size: Adult)   Pulse 78   Temp 36.6 °C (97.8 °F) (Temporal)   Resp 20   Ht 1.676 m (5' 6\")   Wt 72.1 kg (159 lb)   SpO2 97%   Breastfeeding No   BMI 25.66 kg/m²  Body mass index is 25.66 kg/m².    Gen: Alert and oriented, No apparent distress.  Ext: No clubbing, cyanosis, edema.        Labs: Previous labs reviewed.    Assessment & Plan:     80 y.o. female with the following -     1. Chronic renal failure syndrome, stage 3 (moderate) (HCC)  This is a chronic problem.  It was discussed " in detail.  We will see what her current levels are.  If they are stable she could likely go ahead and take the 1 Margaux aspirin a day.  - Renal Function Panel; Future  - ESTIMATED GFR; Future    2. Encounter for screening mammogram for malignant neoplasm of breast  This is a chronic screening issue.  Mammogram ordered.  - MA-SCREENING MAMMO BILAT W/TOMOSYNTHESIS W/CAD; Future    3. Elevated alkaline phosphatase level  This is a chronic problem.  Lab ordered.    4. Chronic left-sided low back pain without sciatica  This is a chronic problem.  Patient told to use a heating pad, Tylenol and diclofenac gel to the area.  She was told she still could have the injection if she so desires as it would be safe to do.      Return if symptoms worsen or fail to improve.  24 minutes spent with the patient.  Please note that this dictation was created using voice recognition software. I have made every reasonable attempt to correct obvious errors, but I expect that there are errors of grammar and possibly content that I did not discover before finalizing the note.

## 2021-08-27 NOTE — ASSESSMENT & PLAN NOTE
This is a chronic problem.  Patient had been on an NSAID couple months ago which she has discontinued.  We will recheck her labs to see if they have improved.

## 2021-08-27 NOTE — ASSESSMENT & PLAN NOTE
This is a new problem.  Patient did not do the labs ordered back in May but she will do those today.  We will notify her of the results.

## 2021-08-27 NOTE — ASSESSMENT & PLAN NOTE
This is a chronic problem.  Patient states she is scheduled to have an injection to see if it helps with her back pain but she is not sure she really wants to do it.  She states that she takes 1 bare aspirin a day the pain is greatly improved and she is asking if that safe to do.  I reviewed her labs and she does have some chronic renal failure so advised her to stop it until we get her lab test.  Otherwise she can use over-the-counter Tylenol and diclofenac gel.

## 2021-08-30 DIAGNOSIS — N18.30 CHRONIC RENAL FAILURE SYNDROME, STAGE 3 (MODERATE): ICD-10-CM

## 2021-08-30 DIAGNOSIS — R74.8 ELEVATED ALKALINE PHOSPHATASE LEVEL: ICD-10-CM

## 2021-08-30 LAB
ALP BONE SERPL-CCNC: 42 U/L (ref 0–55)
ALP ISOS SERPL HS-CCNC: 0 U/L
ALP LIVER SERPL-CCNC: 151 U/L (ref 0–94)
ALP SERPL-CCNC: 193 U/L (ref 40–120)

## 2021-09-03 ENCOUNTER — HOSPITAL ENCOUNTER (OUTPATIENT)
Dept: RADIOLOGY | Facility: MEDICAL CENTER | Age: 81
End: 2021-09-03
Attending: FAMILY MEDICINE
Payer: COMMERCIAL

## 2021-09-03 DIAGNOSIS — R74.8 ELEVATED ALKALINE PHOSPHATASE LEVEL: ICD-10-CM

## 2021-09-03 PROCEDURE — 76705 ECHO EXAM OF ABDOMEN: CPT

## 2021-09-11 ENCOUNTER — HOSPITAL ENCOUNTER (OUTPATIENT)
Dept: RADIOLOGY | Facility: MEDICAL CENTER | Age: 81
End: 2021-09-11
Payer: COMMERCIAL

## 2021-09-30 ENCOUNTER — OFFICE VISIT (OUTPATIENT)
Dept: MEDICAL GROUP | Facility: PHYSICIAN GROUP | Age: 81
End: 2021-09-30
Payer: COMMERCIAL

## 2021-09-30 VITALS
RESPIRATION RATE: 16 BRPM | HEART RATE: 74 BPM | TEMPERATURE: 97.5 F | HEIGHT: 65 IN | WEIGHT: 156.6 LBS | BODY MASS INDEX: 26.09 KG/M2 | SYSTOLIC BLOOD PRESSURE: 112 MMHG | DIASTOLIC BLOOD PRESSURE: 68 MMHG | OXYGEN SATURATION: 98 %

## 2021-09-30 DIAGNOSIS — N18.30 CHRONIC RENAL FAILURE SYNDROME, STAGE 3 (MODERATE): ICD-10-CM

## 2021-09-30 DIAGNOSIS — I10 ESSENTIAL HYPERTENSION, BENIGN: ICD-10-CM

## 2021-09-30 DIAGNOSIS — Z23 NEED FOR INFLUENZA VACCINATION: ICD-10-CM

## 2021-09-30 DIAGNOSIS — N28.1 KIDNEY CYST, ACQUIRED: ICD-10-CM

## 2021-09-30 DIAGNOSIS — Z23 NEED FOR VACCINATION: ICD-10-CM

## 2021-09-30 DIAGNOSIS — R74.8 ELEVATED ALKALINE PHOSPHATASE LEVEL: ICD-10-CM

## 2021-09-30 PROCEDURE — 90471 IMMUNIZATION ADMIN: CPT | Performed by: FAMILY MEDICINE

## 2021-09-30 PROCEDURE — 90662 IIV NO PRSV INCREASED AG IM: CPT | Performed by: FAMILY MEDICINE

## 2021-09-30 PROCEDURE — 99213 OFFICE O/P EST LOW 20 MIN: CPT | Mod: 25 | Performed by: FAMILY MEDICINE

## 2021-09-30 NOTE — PROGRESS NOTES
Subjective:     CC: Here to have a DMV form completed, wants to get her flu shot and has several questions.    HPI:   Amber presents today with the following medical concerns:    Essential hypertension, benign  This is a chronic problem.  Patient is here today to have a form filled out for her 's license.  She is already had a form filled out by her eye doctor and she does have problems with vision in 1 eye.  Is been present for many years.  She is not on any medications that could affect with her driving.    Chronic renal failure syndrome, stage 3 (moderate) (HCC)  This is a chronic problem.  Renal function is stable.  Continue to follow.    Elevated alkaline phosphatase level  This is a chronic problem.  Patient did want to discuss her elevated alkaline phosphatase and her last ultrasound test.  These were discussed with her.  We will continue to monitor.  If her alkaline phos goes higher then we may want to get an MRI of her liver as the ultrasound did not show anything remarkable.    Kidney cyst, acquired  This is a chronic problem found on a recent ultrasound.  Patient has a simple appearing cyst on her right kidney.  She was told no further work-up of it is required.      Past Medical History:   Diagnosis Date   • Abnormal blood chemistry 2/8/2012   • Abnormal electrocardiogram 2/8/2012   • Breast cancer (HCC) 2/8/2012   • Hyperlipidemia 2/8/2012   • Hypertension 2/8/2012   • Macular degeneration of right eye 2017   • Vitamin d deficiency 2/8/2012       Social History     Tobacco Use   • Smoking status: Never Smoker   • Smokeless tobacco: Never Used   Vaping Use   • Vaping Use: Never used   Substance Use Topics   • Alcohol use: Yes     Alcohol/week: 0.6 oz     Types: 1 Glasses of wine per week     Comment: daily   • Drug use: No       Current Outpatient Medications Ordered in Epic   Medication Sig Dispense Refill   • traMADol (ULTRAM) 50 MG Tab Take 1 Tablet by mouth 1 time a day as needed for up to 30  "days. 30 Tablet 0   • ibandronate (BONIVA) 150 MG tablet Take 1 Tablet by mouth every 30 days. 3 Tablet 3   • atorvastatin (LIPITOR) 20 MG Tab Take 1 tablet by mouth every day. 100 tablet 3   • esomeprazole (NEXIUM) 40 MG delayed-release capsule Take 1 capsule by mouth every day. 90 capsule 3   • valsartan-hydrochlorothiazide (DIOVAN-HCT) 320-12.5 MG per tablet Take 1 tablet by mouth every day. 100 tablet 3   • carvedilol (COREG) 12.5 MG Tab Take 3 in morning and 2 in evening 450 tablet 3   • etodolac (LODINE) 400 MG tablet Take 400 mg by mouth as needed.     • Omega-3 Fatty Acids (FISH OIL CONCENTRATE PO) Take  by mouth.     • Calcium Carbonate-Vitamin D (CALCIUM 500 + D PO) Take  by mouth.     • Multiple Vitamins-Minerals (PRESERVISION AREDS 2 PO) Take  by mouth.     • aspirin 81 MG tablet Take 81 mg by mouth every day.     • Multiple Vitamin (MULTI-VITAMIN) TABS Take  by mouth every day.     • Cholecalciferol (VITAMIN D) 1000 UNIT CAPS Take  by mouth every day.       No current [facility]-ordered facility-administered medications on file.       Allergies:  Amlodipine, Ampicillin, and Penicillins    Health Maintenance: Completed    ROS:  Gen: no fevers/chills, no changes in weight  Eyes: no changes in vision  ENT: no sore throat, no hearing loss, no bloody nose  Pulm: no sob, no cough  CV: no chest pain, no palpitations  GI: no nausea/vomiting, no diarrhea  : no dysuria  MSk: no myalgias  Skin: no rash  Neuro: no headaches, no numbness/tingling  Heme/Lymph: no easy bruising      Objective:       Exam:  /68   Pulse 74   Temp 36.4 °C (97.5 °F)   Resp 16   Ht 1.65 m (5' 4.96\")   Wt 71 kg (156 lb 9.6 oz)   SpO2 98%   BMI 26.09 kg/m²  Body mass index is 26.09 kg/m².    Gen: Alert and oriented, No apparent distress.    Labs: Lab results reviewed with patient.    Assessment & Plan:     80 y.o. female with the following -     1. Need for vaccination  This is an immunization issue.    2. Need for influenza " vaccination  This is a flu vaccine issue.  Vaccine given today.  Side effects discussed.  This is a chronic problem  - Influenza Vaccine, High Dose (65+ Only)    3. Essential hypertension, benign  This is a chronic problem.  Blood pressures well controlled.  Her form for DMV was completed.    4. Chronic renal failure syndrome, stage 3 (moderate) (HCC)  This is a chronic problem.  Continue to follow.    5. Elevated alkaline phosphatase level  This is a chronic problem.  Discussed with patient if her alkaline phos is higher on her next test and we may want to do further investigation.    6. Kidney cyst, acquired  This is a chronic problem.  No further work-up needed.      Return in about 6 months (around 3/30/2022) for Long.  21 minutes spent with the patient.  Please note that this dictation was created using voice recognition software. I have made every reasonable attempt to correct obvious errors, but I expect that there are errors of grammar and possibly content that I did not discover before finalizing the note.

## 2021-09-30 NOTE — ASSESSMENT & PLAN NOTE
This is a chronic problem found on a recent ultrasound.  Patient has a simple appearing cyst on her right kidney.  She was told no further work-up of it is required.

## 2021-09-30 NOTE — ASSESSMENT & PLAN NOTE
This is a chronic problem.  Patient is here today to have a form filled out for her 's license.  She is already had a form filled out by her eye doctor and she does have problems with vision in 1 eye.  Is been present for many years.  She is not on any medications that could affect with her driving.

## 2021-09-30 NOTE — ASSESSMENT & PLAN NOTE
This is a chronic problem.  Patient did want to discuss her elevated alkaline phosphatase and her last ultrasound test.  These were discussed with her.  We will continue to monitor.  If her alkaline phos goes higher then we may want to get an MRI of her liver as the ultrasound did not show anything remarkable.

## 2021-12-13 ENCOUNTER — HOSPITAL ENCOUNTER (OUTPATIENT)
Dept: RADIOLOGY | Facility: MEDICAL CENTER | Age: 81
End: 2021-12-13
Attending: FAMILY MEDICINE
Payer: COMMERCIAL

## 2021-12-13 ENCOUNTER — TELEPHONE (OUTPATIENT)
Dept: MEDICAL GROUP | Facility: PHYSICIAN GROUP | Age: 81
End: 2021-12-13

## 2021-12-13 DIAGNOSIS — Z12.31 ENCOUNTER FOR SCREENING MAMMOGRAM FOR MALIGNANT NEOPLASM OF BREAST: ICD-10-CM

## 2021-12-13 PROCEDURE — 77063 BREAST TOMOSYNTHESIS BI: CPT

## 2021-12-13 NOTE — TELEPHONE ENCOUNTER
----- Message from Juan Carlos Aquino M.D. sent at 12/13/2021 12:47 PM PST -----  Please notify patient about their result(s)     My name is Juan Carlos Aquino MD.  I am covering for your primary care provider Dav Wheeler III, M.D.  who is out of the office this week.     The recent mammogram demonstrated that you have dense breasts.     Dense breasts may have small masses that can be obscured on typical mammogram studies.   We strongly recommend a follow up SonoCine test to be done.  SonoCine is a new Ultrasound technique that is being used to screen for breast cancer in women with dense breasts. Unfortunately, most insurances may not cover this exam.  If you are interested in having this study done, please contact our office for an order.    Otherwise, screening mammogram in one year is recommended  by our Radiologist.          Please follow up with your pcp AMADOR Miranda III, MD

## 2022-03-28 RX ORDER — VALSARTAN AND HYDROCHLOROTHIAZIDE 320; 12.5 MG/1; MG/1
1 TABLET, FILM COATED ORAL DAILY
Qty: 100 TABLET | Refills: 1 | Status: SHIPPED | OUTPATIENT
Start: 2022-03-28 | End: 2022-08-10 | Stop reason: SDUPTHER

## 2022-03-28 RX ORDER — ESOMEPRAZOLE MAGNESIUM 40 MG/1
40 CAPSULE, DELAYED RELEASE ORAL DAILY
Qty: 90 CAPSULE | Refills: 1 | Status: SHIPPED | OUTPATIENT
Start: 2022-03-28 | End: 2022-08-10 | Stop reason: SDUPTHER

## 2022-03-28 RX ORDER — ATORVASTATIN CALCIUM 20 MG/1
20 TABLET, FILM COATED ORAL DAILY
Qty: 100 TABLET | Refills: 1 | Status: SHIPPED | OUTPATIENT
Start: 2022-03-28 | End: 2022-08-10 | Stop reason: SDUPTHER

## 2022-04-04 RX ORDER — CARVEDILOL 12.5 MG/1
TABLET ORAL
Qty: 450 TABLET | Refills: 1 | Status: SHIPPED | OUTPATIENT
Start: 2022-04-04 | End: 2022-08-10 | Stop reason: SDUPTHER

## 2022-05-18 ENCOUNTER — APPOINTMENT (RX ONLY)
Dept: URBAN - METROPOLITAN AREA CLINIC 22 | Facility: CLINIC | Age: 82
Setting detail: DERMATOLOGY
End: 2022-05-18

## 2022-05-18 DIAGNOSIS — L81.4 OTHER MELANIN HYPERPIGMENTATION: ICD-10-CM

## 2022-05-18 DIAGNOSIS — D22 MELANOCYTIC NEVI: ICD-10-CM

## 2022-05-18 DIAGNOSIS — L82.1 OTHER SEBORRHEIC KERATOSIS: ICD-10-CM

## 2022-05-18 DIAGNOSIS — Z71.89 OTHER SPECIFIED COUNSELING: ICD-10-CM

## 2022-05-18 DIAGNOSIS — D18.0 HEMANGIOMA: ICD-10-CM

## 2022-05-18 DIAGNOSIS — L57.0 ACTINIC KERATOSIS: ICD-10-CM

## 2022-05-18 PROBLEM — D22.5 MELANOCYTIC NEVI OF TRUNK: Status: ACTIVE | Noted: 2022-05-18

## 2022-05-18 PROBLEM — D18.01 HEMANGIOMA OF SKIN AND SUBCUTANEOUS TISSUE: Status: ACTIVE | Noted: 2022-05-18

## 2022-05-18 PROCEDURE — ? SUNSCREEN RECOMMENDATIONS

## 2022-05-18 PROCEDURE — ? PHOTO-DOCUMENTATION

## 2022-05-18 PROCEDURE — 17000 DESTRUCT PREMALG LESION: CPT

## 2022-05-18 PROCEDURE — ? LIQUID NITROGEN

## 2022-05-18 PROCEDURE — ? COUNSELING

## 2022-05-18 PROCEDURE — 99203 OFFICE O/P NEW LOW 30 MIN: CPT | Mod: 25

## 2022-05-18 PROCEDURE — 17003 DESTRUCT PREMALG LES 2-14: CPT

## 2022-05-18 ASSESSMENT — LOCATION SIMPLE DESCRIPTION DERM
LOCATION SIMPLE: LEFT SHOULDER
LOCATION SIMPLE: LEFT UPPER BACK
LOCATION SIMPLE: LEFT LIP
LOCATION SIMPLE: ABDOMEN
LOCATION SIMPLE: RIGHT CHEEK
LOCATION SIMPLE: RIGHT UPPER BACK
LOCATION SIMPLE: LEFT CHEEK
LOCATION SIMPLE: LEFT EYEBROW
LOCATION SIMPLE: LEFT FOREARM
LOCATION SIMPLE: NOSE
LOCATION SIMPLE: LEFT NOSE

## 2022-05-18 ASSESSMENT — LOCATION DETAILED DESCRIPTION DERM
LOCATION DETAILED: NASAL DORSUM
LOCATION DETAILED: LEFT PROXIMAL DORSAL FOREARM
LOCATION DETAILED: LEFT SUPERIOR CENTRAL MALAR CHEEK
LOCATION DETAILED: RIGHT LATERAL MALAR CHEEK
LOCATION DETAILED: LEFT SUPERIOR LATERAL MALAR CHEEK
LOCATION DETAILED: LEFT INFERIOR UPPER BACK
LOCATION DETAILED: LEFT NASAL SIDEWALL
LOCATION DETAILED: LEFT UPPER CUTANEOUS LIP
LOCATION DETAILED: LEFT CENTRAL EYEBROW
LOCATION DETAILED: LEFT LATERAL ABDOMEN
LOCATION DETAILED: RIGHT MID-UPPER BACK
LOCATION DETAILED: LEFT ANTERIOR SHOULDER
LOCATION DETAILED: LEFT INFERIOR LATERAL MALAR CHEEK

## 2022-05-18 ASSESSMENT — LOCATION ZONE DERM
LOCATION ZONE: ARM
LOCATION ZONE: TRUNK
LOCATION ZONE: FACE
LOCATION ZONE: LIP
LOCATION ZONE: NOSE

## 2022-05-18 NOTE — PROCEDURE: LIQUID NITROGEN
Post-Care Instructions: I reviewed with the patient in detail post-care instructions. Patient is to wear sunprotection, and avoid picking at any of the treated lesions. Pt may apply Vaseline to crusted or scabbing areas.
Duration Of Freeze Thaw-Cycle (Seconds): 3
Render Note In Bullet Format When Appropriate: No
Consent: The patient's consent was obtained including but not limited to risks of crusting, scabbing, blistering, scarring, darker or lighter pigmentary change, recurrence, incomplete removal and infection.
Show Applicator Variable?: Yes
Number Of Freeze-Thaw Cycles: 2 freeze-thaw cycles
Detail Level: Detailed

## 2022-06-27 ENCOUNTER — OFFICE VISIT (OUTPATIENT)
Dept: MEDICAL GROUP | Facility: PHYSICIAN GROUP | Age: 82
End: 2022-06-27
Payer: COMMERCIAL

## 2022-06-27 VITALS
BODY MASS INDEX: 26.46 KG/M2 | OXYGEN SATURATION: 98 % | HEART RATE: 62 BPM | RESPIRATION RATE: 18 BRPM | HEIGHT: 65 IN | DIASTOLIC BLOOD PRESSURE: 74 MMHG | TEMPERATURE: 97.3 F | SYSTOLIC BLOOD PRESSURE: 140 MMHG | WEIGHT: 158.8 LBS

## 2022-06-27 DIAGNOSIS — G89.29 CHRONIC LEFT-SIDED LOW BACK PAIN WITHOUT SCIATICA: ICD-10-CM

## 2022-06-27 DIAGNOSIS — Z12.11 COLON CANCER SCREENING: ICD-10-CM

## 2022-06-27 DIAGNOSIS — R74.8 ELEVATED ALKALINE PHOSPHATASE LEVEL: ICD-10-CM

## 2022-06-27 DIAGNOSIS — M54.50 CHRONIC LEFT-SIDED LOW BACK PAIN WITHOUT SCIATICA: ICD-10-CM

## 2022-06-27 DIAGNOSIS — N18.30 CHRONIC RENAL FAILURE SYNDROME, STAGE 3 (MODERATE): ICD-10-CM

## 2022-06-27 DIAGNOSIS — E87.1 HYPONATREMIA: ICD-10-CM

## 2022-06-27 DIAGNOSIS — I10 ESSENTIAL HYPERTENSION, BENIGN: ICD-10-CM

## 2022-06-27 PROCEDURE — 99214 OFFICE O/P EST MOD 30 MIN: CPT | Performed by: FAMILY MEDICINE

## 2022-06-27 RX ORDER — METHYLPREDNISOLONE 4 MG/1
TABLET ORAL
Qty: 42 TABLET | Refills: 0 | Status: SHIPPED | OUTPATIENT
Start: 2022-06-27 | End: 2022-08-23

## 2022-06-27 RX ORDER — CLINDAMYCIN HYDROCHLORIDE 150 MG/1
CAPSULE ORAL
COMMUNITY
Start: 2022-06-06 | End: 2022-06-27

## 2022-06-27 ASSESSMENT — PATIENT HEALTH QUESTIONNAIRE - PHQ9: CLINICAL INTERPRETATION OF PHQ2 SCORE: 0

## 2022-06-27 NOTE — ASSESSMENT & PLAN NOTE
This is a chronic problem.  Patient's blood pressure is borderline today.  She states is very upset with a home remodeling project that is going on.  And also her back is been bothering her so she is not sleeping well.  We will continue to monitor.

## 2022-06-27 NOTE — ASSESSMENT & PLAN NOTE
This is a chronic problem.  Patient just had labs and the alkaline phosphatase was lower than before.  It was around 158.  This is most likely due to arthritis.

## 2022-06-27 NOTE — ASSESSMENT & PLAN NOTE
This is a chronic problem.  Patient just had labs done and her sodium was improved to 130.  We will continue to monitor

## 2022-06-27 NOTE — ASSESSMENT & PLAN NOTE
This is a chronic problem.  Recent lab work showed GFR 59.  We will continue to monitor and continue to avoid NSAIDs.  She does have Lodine but uses it very rarely.  We will continue to monitor that.

## 2022-06-27 NOTE — PROGRESS NOTES
Subjective:     CC: Here for several reasons.    HPI:   Amber presents today with the following medical concerns:    Chronic left-sided low back pain without sciatica  This is a chronic problem.  Patient states she was unable to get into the doctor she wanted to for possible epidural injection.  She is asking for new referral.  Also she would like to try physical therapy at the [facility] as she is been there before for her back and it was helpful.  She cannot take NSAIDs since her GFR is just slightly below normal.  Tylenol does not always work.  She would like something to help her now but not a pain medicine.  She did bring in a copy of an MRI done in 2020 and was put into the chart.  Showed mostly just arthritic changes and some anterior listhesis along with some disc space narrowing.  No nerve impingement seen.    Chronic renal failure syndrome, stage 3 (moderate) ([facility])  This is a chronic problem.  Recent lab work showed GFR 59.  We will continue to monitor and continue to avoid NSAIDs.  She does have Lodine but uses it very rarely.  We will continue to monitor that.    Elevated alkaline phosphatase level  This is a chronic problem.  Patient just had labs and the alkaline phosphatase was lower than before.  It was around 158.  This is most likely due to arthritis.    Essential hypertension, benign  This is a chronic problem.  Patient's blood pressure is borderline today.  She states is very upset with a home remodeling project that is going on.  And also her back is been bothering her so she is not sleeping well.  We will continue to monitor.    Hyponatremia  This is a chronic problem.  Patient just had labs done and her sodium was improved to 130.  We will continue to monitor      Past Medical History:   Diagnosis Date   • Abnormal blood chemistry 2/8/2012   • Abnormal electrocardiogram 2/8/2012   • Breast cancer (HCC) 2/8/2012   • Hyperlipidemia 2/8/2012   • Hypertension 2/8/2012   • Macular degeneration of right  eye 2017   • Vitamin d deficiency 2/8/2012       Social History     Tobacco Use   • Smoking status: Never Smoker   • Smokeless tobacco: Never Used   Vaping Use   • Vaping Use: Never used   Substance Use Topics   • Alcohol use: Yes     Alcohol/week: 0.6 oz     Types: 1 Glasses of wine per week     Comment: daily   • Drug use: No       Current Outpatient Medications Ordered in Epic   Medication Sig Dispense Refill   • methylPREDNISolone (MEDROL DOSEPAK) 4 MG Tablet Therapy Pack As directed on the packaging label. 42 Tablet 0   • carvedilol (COREG) 12.5 MG Tab Take 3 in morning and 2 in evening 450 Tablet 1   • atorvastatin (LIPITOR) 20 MG Tab Take 1 Tablet by mouth every day. 100 Tablet 1   • esomeprazole (NEXIUM) 40 MG delayed-release capsule Take 1 Capsule by mouth every day. 90 Capsule 1   • valsartan-hydrochlorothiazide (DIOVAN-HCT) 320-12.5 MG per tablet Take 1 Tablet by mouth every day. 100 Tablet 1   • ibandronate (BONIVA) 150 MG tablet Take 1 Tablet by mouth every 30 days. 3 Tablet 3   • etodolac (LODINE) 400 MG tablet Take 400 mg by mouth as needed.     • Omega-3 Fatty Acids (FISH OIL CONCENTRATE PO) Take  by mouth.     • Calcium Carbonate-Vitamin D (CALCIUM 500 + D PO) Take  by mouth.     • Multiple Vitamins-Minerals (PRESERVISION AREDS 2 PO) Take  by mouth.     • Multiple Vitamin (MULTI-VITAMIN) TABS Take  by mouth every day.     • Cholecalciferol (VITAMIN D) 1000 UNIT CAPS Take  by mouth every day.       No current [facility]-ordered facility-administered medications on file.       Allergies:  Amlodipine, Ampicillin, and Penicillins    Health Maintenance: Completed    ROS:  Gen: no fevers/chills, no changes in weight  Eyes: no changes in vision  ENT: no sore throat, no hearing loss, no bloody nose  Pulm: no sob, no cough  CV: no chest pain, no palpitations  GI: no nausea/vomiting, no diarrhea  : no dysuria  MSk: no myalgias  Skin: no rash  Neuro: no headaches, no numbness/tingling  Heme/Lymph: no easy  "bruising      Objective:       Exam:  BP (!) 140/74 (BP Location: Left arm, Patient Position: Sitting, BP Cuff Size: Adult)   Pulse 62   Temp 36.3 °C (97.3 °F) (Temporal)   Resp 18   Ht 1.651 m (5' 5\")   Wt 72 kg (158 lb 12.8 oz)   SpO2 98%   BMI 26.43 kg/m²  Body mass index is 26.43 kg/m².    Gen: Alert and oriented, No apparent distress.  Ext: No clubbing, cyanosis, edema.  Neuro: Cranial nerves II through VIII are grossly intact.  No lateralized signs are seen.  Gait is normal.  Psych: Patient is alert and orient x3.  No unusual topics expressed.  Insight and judgment is good.      Labs: Labs she brought in are reviewed with her.    Assessment & Plan:     81 y.o. female with the following -     1. Chronic left-sided low back pain without sciatica  This is a chronic problem.  Referral to physical therapy and pain clinic made.  We will also try her on a Medrol Dosepak.  I gave her want to take now and she is going on vacation soon and she was given a second 1 to use during that if needed.  Continue to avoid NSAIDs.  - Referral to Physical Therapy  - Referral to Pain Clinic    2. Colon cancer screening  This is a screening issue.  Cologuard ordered.  - COLOGUARD (FIT DNA)    3. Elevated alkaline phosphatase level  This is a chronic problem.  Continue to monitor.  Likely due to arthritis.    4. Essential hypertension, benign  This is a chronic problem.  Systolic pressure today is borderline.  We will continue to monitor.    5. Hyponatremia  This is a chronic problem.  Slightly improved.  Continue to monitor.    6. Chronic renal failure syndrome, stage 3 (moderate) (HCC)  This is a chronic problem.  Continue to avoid NSAIDs.      Return if symptoms worsen or fail to improve.    Please note that this dictation was created using voice recognition software. I have made every reasonable attempt to correct obvious errors, but I expect that there are errors of grammar and possibly content that I did not discover " before finalizing the note.

## 2022-06-27 NOTE — ASSESSMENT & PLAN NOTE
This is a chronic problem.  Patient states she was unable to get into the doctor she wanted to for possible epidural injection.  She is asking for new referral.  Also she would like to try physical therapy at the [facility] as she is been there before for her back and it was helpful.  She cannot take NSAIDs since her GFR is just slightly below normal.  Tylenol does not always work.  She would like something to help her now but not a pain medicine.  She did bring in a copy of an MRI done in 2020 and was put into the chart.  Showed mostly just arthritic changes and some anterior listhesis along with some disc space narrowing.  No nerve impingement seen.

## 2022-07-14 ENCOUNTER — HOSPITAL ENCOUNTER (OUTPATIENT)
Dept: RADIOLOGY | Facility: MEDICAL CENTER | Age: 82
End: 2022-07-14
Attending: NURSE PRACTITIONER
Payer: COMMERCIAL

## 2022-07-25 ENCOUNTER — TELEPHONE (OUTPATIENT)
Dept: MEDICAL GROUP | Facility: PHYSICIAN GROUP | Age: 82
End: 2022-07-25
Payer: COMMERCIAL

## 2022-07-25 RX ORDER — BENZONATATE 200 MG/1
200 CAPSULE ORAL 3 TIMES DAILY PRN
Qty: 30 CAPSULE | Refills: 0 | Status: SHIPPED | OUTPATIENT
Start: 2022-07-25 | End: 2023-01-03

## 2022-07-25 NOTE — TELEPHONE ENCOUNTER
PHONECALL  1. Caller Name: patient                      Call Back Number: [phone]    2. Message: pt called and stated that the ship nurse on the cruise she is on tested her for Covid and she is positive. She states the only thing that bothers her is a cough and she is wondering if you could send a medication to help her with her cough.    she's in [place], [place]

## 2022-07-29 RX ORDER — IBANDRONATE SODIUM 150 MG/1
150 TABLET, FILM COATED ORAL
Qty: 3 TABLET | Refills: 3 | Status: SHIPPED | OUTPATIENT
Start: 2022-07-29 | End: 2023-06-27 | Stop reason: SDUPTHER

## 2022-08-03 ENCOUNTER — APPOINTMENT (OUTPATIENT)
Dept: PHYSICAL MEDICINE AND REHAB | Facility: MEDICAL CENTER | Age: 82
End: 2022-08-03
Payer: COMMERCIAL

## 2022-08-11 RX ORDER — ESOMEPRAZOLE MAGNESIUM 40 MG/1
40 CAPSULE, DELAYED RELEASE ORAL DAILY
Qty: 90 CAPSULE | Refills: 1 | Status: SHIPPED | OUTPATIENT
Start: 2022-08-11 | End: 2023-01-10 | Stop reason: SDUPTHER

## 2022-08-11 RX ORDER — VALSARTAN AND HYDROCHLOROTHIAZIDE 320; 12.5 MG/1; MG/1
1 TABLET, FILM COATED ORAL DAILY
Qty: 100 TABLET | Refills: 1 | Status: SHIPPED | OUTPATIENT
Start: 2022-08-11 | End: 2023-01-10 | Stop reason: SDUPTHER

## 2022-08-11 RX ORDER — ATORVASTATIN CALCIUM 20 MG/1
20 TABLET, FILM COATED ORAL DAILY
Qty: 100 TABLET | Refills: 1 | Status: SHIPPED | OUTPATIENT
Start: 2022-08-11 | End: 2023-01-10 | Stop reason: SDUPTHER

## 2022-08-11 RX ORDER — CARVEDILOL 12.5 MG/1
TABLET ORAL
Qty: 450 TABLET | Refills: 1 | Status: SHIPPED | OUTPATIENT
Start: 2022-08-11 | End: 2023-01-15 | Stop reason: SDUPTHER

## 2022-08-23 ENCOUNTER — OFFICE VISIT (OUTPATIENT)
Dept: PHYSICAL MEDICINE AND REHAB | Facility: MEDICAL CENTER | Age: 82
End: 2022-08-23
Payer: COMMERCIAL

## 2022-08-23 VITALS
SYSTOLIC BLOOD PRESSURE: 122 MMHG | DIASTOLIC BLOOD PRESSURE: 68 MMHG | BODY MASS INDEX: 26.19 KG/M2 | WEIGHT: 157.2 LBS | OXYGEN SATURATION: 97 % | HEART RATE: 67 BPM | HEIGHT: 65 IN | TEMPERATURE: 97.6 F

## 2022-08-23 DIAGNOSIS — M54.50 LUMBOSACRAL PAIN: ICD-10-CM

## 2022-08-23 DIAGNOSIS — M47.816 LUMBAR SPONDYLOSIS: ICD-10-CM

## 2022-08-23 DIAGNOSIS — M51.36 DDD (DEGENERATIVE DISC DISEASE), LUMBAR: ICD-10-CM

## 2022-08-23 DIAGNOSIS — M41.80 DEXTROSCOLIOSIS: ICD-10-CM

## 2022-08-23 DIAGNOSIS — M43.16 SPONDYLOLISTHESIS OF LUMBAR REGION: ICD-10-CM

## 2022-08-23 PROCEDURE — 99204 OFFICE O/P NEW MOD 45 MIN: CPT | Performed by: PHYSICAL MEDICINE & REHABILITATION

## 2022-08-23 ASSESSMENT — PAIN SCALES - GENERAL: PAINLEVEL: 5=MODERATE PAIN

## 2022-08-23 ASSESSMENT — PATIENT HEALTH QUESTIONNAIRE - PHQ9: CLINICAL INTERPRETATION OF PHQ2 SCORE: 0

## 2022-08-23 NOTE — PROGRESS NOTES
"New patient note    Physiatry (physical medicine and  Rehabilitation), interventional spine and sports medicine    Date of Service: 8/23/2022    Chief complaint:   Chief Complaint   Patient presents with    New Patient     Low back pain       HISTORY    HPI: Amber Roach 81 y.o. 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  This is primarily axial.  This seems to come on with activity.  Sitting, usually, pain is not present.    From what she reports, she did PT for her hip.  Reports that she had some improvement from \"stretching\" on a traction device.  Pain is axial and she feels this in the axial spine bilaterally.  From what she reports, she tries to do stretches.  She reaches over and touches her toes.    She had an MRI of her lumbar spine 12/19/2020 and had been seen at [facility] at that time.  Injections were recommended with Dr. Roberot on 10/20/2021.    From what she reports, she has had other injections with steroids and these did not last for her right knee.  Dr. Cruz did an injection in the right hip, but this did not last.    Previously, she was taking tramadol twice a day.  Reports that she takes tylenol about twice a day.  Reports that she usually does not take more than 3/day.  Margaux back and body helps.    She did take a medrol dose pack and this helped her to tolerate going on a cruise to [place] this year.    Medical records review:  I reviewed the note from the referring provider Dav Wheeler III, M.* dated 06/27/2022.      Previous treatments:    Physical Therapy: Yes, but not for her back    Medications the patient is tried: tylenol and tramadol    Previous interventions: none recently    Previous surgeries to relieve the above pain:  no back surgeries      ROS:   Red Flags ROS:   Fever, Chills, Sweats: Denies  Involuntary Weight Loss: Denies  Bladder Incontinence: Denies  Bowel Incontinence: Denies  Saddle " Anesthesia: Denies    All other systems reviewed and negative.       PMHx:   Past Medical History:   Diagnosis Date    Abnormal blood chemistry 2/8/2012    Abnormal electrocardiogram 2/8/2012    Breast cancer (HCC) 2/8/2012    Hyperlipidemia 2/8/2012    Hypertension 2/8/2012    Macular degeneration of right eye 2017    Vitamin d deficiency 2/8/2012       PSHx:   Past Surgical History:   Procedure Laterality Date    ARTHROPLASTY Right 06/2020    right replacement    CHOLECYSTECTOMY  09/2013    LIVER BIOPSY  2013    PBC autoimmmune    LUMPECTOMY Right 01/1994    lumpectomy for cancer    ABDOMINAL HYSTERECTOMY TOTAL  09/1993    APPENDECTOMY  1953    KNEE ARTHROSCOPY Bilateral 1995 and 1996    OTHER      appendectomy 1952 right knee replacement 1995 liver biopsy 2013 hysterectomy 1993 left knee replacement 1997 breast lumpectomy 1994 cholecystectomy 2013       Family history   Family History   Problem Relation Age of Onset    Heart Disease Brother     Heart Attack Brother          Medications:   Current Outpatient Medications   Medication    esomeprazole (NEXIUM) 40 MG delayed-release capsule    valsartan-hydrochlorothiazide (DIOVAN-HCT) 320-12.5 MG per tablet    carvedilol (COREG) 12.5 MG Tab    atorvastatin (LIPITOR) 20 MG Tab    ibandronate (BONIVA) 150 MG tablet    benzonatate (TESSALON) 200 MG capsule    Omega-3 Fatty Acids (FISH OIL CONCENTRATE PO)    Calcium Carbonate-Vitamin D (CALCIUM 500 + D PO)    Multiple Vitamins-Minerals (PRESERVISION AREDS 2 PO)    Multiple Vitamin (MULTI-VITAMIN) TABS    Cholecalciferol (VITAMIN D) 1000 UNIT CAPS     No current facility-administered medications for this visit.       Allergies:   Allergies   Allergen Reactions    Amlodipine      hives    Ampicillin     Penicillins        Social Hx:   Social History     Socioeconomic History    Marital status:      Spouse name: Not on file    Number of children: Not on file    Years of education: Not on file    Highest education  "level: Not on file   Occupational History    Not on file   Tobacco Use    Smoking status: Never    Smokeless tobacco: Never   Vaping Use    Vaping Use: Never used   Substance and Sexual Activity    Alcohol use: Yes     Alcohol/week: 0.6 oz     Types: 1 Glasses of wine per week     Comment: daily    Drug use: No    Sexual activity: Not Currently   Other Topics Concern    Not on file   Social History Narrative    Not on file     Social Determinants of Health     Financial Resource Strain: Not on file   Food Insecurity: Not on file   Transportation Needs: Not on file   Physical Activity: Not on file   Stress: Not on file   Social Connections: Not on file   Intimate Partner Violence: Not on file   Housing Stability: Not on file         EXAMINATION     Physical Exam:   Vitals: /68 (BP Location: Right arm, Patient Position: Sitting, BP Cuff Size: Adult long)   Pulse 67   Temp 36.4 °C (97.6 °F) (Temporal)   Ht 1.651 m (5' 5\")   Wt 71.3 kg (157 lb 3.2 oz)   SpO2 97%     Constitutional:   Body Habitus: Body mass index is 26.16 kg/m².  Cooperation: Fully cooperates with exam  Appearance: Well-groomed, well-nourished, not disheveled, in no acute distress    Eyes: No scleral icterus, no proptosis     ENT -no obvious auditory deficits, wearing a face mask     Skin -no rashes or lesions noted     Respiratory-  breathing comfortable on room air, no audible wheezing    Cardiovascular- capillary refills less than 2 seconds. No lower extremity edema is noted.     Psychiatric- alert and oriented ×3. Normal affect.     Gait - normal gait, no use of ambulatory device, nonantalgic.     Musculoskeletal -   Cervical spine   Inspection: No deformities of the skin over the cervical spine. No rashes or lesions    No signs of muscular atrophy in bilateral upper extremities     No tenderness to palpation of the cervical facets    Thoracic/Lumbar Spine/Sacral Spine/Hips   Inspection: No evidence of atrophy in bilateral lower " extremities throughout     ROM: decreased AROM with flexion, extension, lateral flexion, and rotation bilaterally, with pain in the lumbar spine with extension and quadrant loading    Palpation:   No tenderness to palpation in midline at T1-T12 levels. No tenderness to palpation in the left and right of the midline T1-L5  palpation over SI joint: positive right, negative left    palpation over buttock: negative bilaterally    palpation in hip or over the greater trochanters: negative bilaterally      Lumbar spine Special tests  Neuro tension  Straight leg test negative bilaterally    Slump test negative bilaterally      HIP  Range of motion in the hips with decreased ROM of the hips bilaterally    SI joint tests  Observation patient sits on one buttocks: Negative  DEXTER test positive bilaterally       Neuro       Key points for the international standards for neurological classification of spinal cord injury (ISNCSCI) to light touch.     Dermatome R L   L2 2 2   L3 2 2   L4 2 2   L5 2 2   S1 2 2   S2 2 2       Motor Exam Lower Extremities    ? Myotome R L   Hip flexion L2 5 5   Knee extension L3 5 5   Ankle dorsiflexion L4 5 5   Toe extension L5 5- 5-   Ankle plantarflexion S1 5 5       Clonus of the ankle negative bilaterally     Reflexes  ?  R L   Biceps  2+  2+   Brachioradialis  2+ 2+   Patella  absent absent   Achilles   2+ 2+       MEDICAL DECISION MAKING    Medical records review: see under HPI section.     DATA    Labs:   06/02/2022; 130 Na, BUN 12, Cr 0.96, AST 36, GFR 55    Lab Results   Component Value Date/Time    SODIUM 131 (L) 08/27/2021 12:21 PM    POTASSIUM 4.5 08/27/2021 12:21 PM    CHLORIDE 95 (L) 08/27/2021 12:21 PM    CO2 23 08/27/2021 12:21 PM    GLUCOSE 94 08/27/2021 12:21 PM    BUN 15 08/27/2021 12:21 PM    CREATININE 0.88 08/27/2021 12:21 PM    CALCIUM 9.5 08/27/2021 12:21 PM    ALBUMIN 4.2 08/27/2021 12:21 PM       No results found for: PROTHROMBTM, INR     Lab Results   Component Value  Date/Time    RBC 4.08 06/19/2020 02:00 PM    HEMOGLOBIN 9.6 (L) 06/25/2020 04:28 AM    HEMATOCRIT 28.4 (L) 06/25/2020 04:28 AM    MCV 98.1 06/19/2020 02:00 PM    MCH 32.9 06/19/2020 02:00 PM    MCHC 33.5 06/19/2020 02:00 PM    MPV 6.3 (L) 06/19/2020 02:00 PM    NEUTSPOLYS 65 06/19/2020 02:00 PM    LYMPHOCYTES 23 06/19/2020 02:00 PM    MONOCYTES 9 06/19/2020 02:00 PM    EOSINOPHILS 3 06/19/2020 02:00 PM    BASOPHILS 1 06/19/2020 02:00 PM        Lab Results   Component Value Date/Time    HBA1C 5.5 06/19/2020 02:00 PM        Imaging: I personally reviewed following images, these are my reads  MRI lumbar spine 12/19/2020  Lumbar dextroscoliosis.  At L1-2, disc height loss, no central or foraminal stenosis  At L2-3, disc height loss, facet arthropathy, no central or foraminal stenosis  At L3-4, disc height loss, facet arthropathy, mild foraminal stenosis bilaterally, no central canal stenosis  At L4-5, disc height loss, facet arthropathy, mild central canal stenosis, mild right foraminal stenosis  At L5-S1, grade I anterolisthesis, bilateral face arthropathy, mild foraminal stenosis bilaterally, no central stenosis        IMAGING radiology reads. I reviewed the following radiology reads                        MR-LUMBAR SPINE-W/O RDC 12/19/2020                                                                                     Diagnosis   Visit Diagnoses     ICD-10-CM   1. Lumbosacral pain  M54.50   2. DDD (degenerative disc disease), lumbar  M51.36   3. Lumbar spondylosis  M47.816   4. Dextroscoliosis  M41.80   5. Spondylolisthesis of lumbar region  M43.16           ASSESSMENT:  Amber Roach 81 y.o. female seen for above     Amber was seen today for new patient.    Diagnoses and all orders for this visit:    Lumbosacral pain  -     Referral to Physical Therapy    DDD (degenerative disc disease), lumbar  -     Referral to Physical Therapy  -     DX-LUMBAR SPINE-4+ VIEWS; Future    Lumbar spondylosis  -     Referral to  Physical Therapy  -     DX-LUMBAR SPINE-4+ VIEWS; Future    Dextroscoliosis  -     Referral to Physical Therapy    Spondylolisthesis of lumbar region  -     DX-LUMBAR SPINE-4+ VIEWS; Future        Discussed trial of physical therapy  Reviewed MRI from the most recent MRI in 2020.  Discussed possible diagnostic medial branch blocks depending on response to PT.  Primarily axial pain today.  Agreed to start with PT.  Avoid NSAIDs.  Discussed minimizing tylenol.  She has taken tramadol in the past, but not recently.  Limit tylenol to no more than 3/day.  Continue to monitor bloodwork.  Discussed limiting william back and body, no more than once a day at most.  Discussed avoiding HVLA/chiropractic techniques.    Reports some benefit and that they are using gentle techniques without HVLA.      Follow-up: Return in about 2 months (around 10/23/2022).      Thank you very much for asking me to participate in Amber Roach's care.  Please contact me with any questions or concerns.    Please note that this dictation was created using voice recognition software. I have made every reasonable attempt to correct obvious errors but there may be errors of grammar and content that I may have overlooked prior to finalization of this note.      Gustavo Aguilar MD  Physical Medicine and Rehabilitation  Interventional Spine and Sports Physiatry  [facility]           Dav Mackay III, M.

## 2022-08-26 ENCOUNTER — HOSPITAL ENCOUNTER (OUTPATIENT)
Dept: RADIOLOGY | Facility: MEDICAL CENTER | Age: 82
End: 2022-08-26
Attending: PHYSICAL MEDICINE & REHABILITATION
Payer: COMMERCIAL

## 2022-08-26 DIAGNOSIS — M51.36 DDD (DEGENERATIVE DISC DISEASE), LUMBAR: ICD-10-CM

## 2022-08-26 DIAGNOSIS — M47.816 LUMBAR SPONDYLOSIS: ICD-10-CM

## 2022-08-26 DIAGNOSIS — M43.16 SPONDYLOLISTHESIS OF LUMBAR REGION: ICD-10-CM

## 2022-08-26 PROCEDURE — 72110 X-RAY EXAM L-2 SPINE 4/>VWS: CPT

## 2022-09-07 ENCOUNTER — APPOINTMENT (RX ONLY)
Dept: URBAN - METROPOLITAN AREA CLINIC 22 | Facility: CLINIC | Age: 82
Setting detail: DERMATOLOGY
End: 2022-09-07

## 2022-09-07 DIAGNOSIS — L57.0 ACTINIC KERATOSIS: ICD-10-CM

## 2022-09-07 DIAGNOSIS — L259 CONTACT DERMATITIS AND OTHER ECZEMA, UNSPECIFIED CAUSE: ICD-10-CM

## 2022-09-07 DIAGNOSIS — L82.0 INFLAMED SEBORRHEIC KERATOSIS: ICD-10-CM

## 2022-09-07 PROBLEM — L30.8 OTHER SPECIFIED DERMATITIS: Status: ACTIVE | Noted: 2022-09-07

## 2022-09-07 PROCEDURE — ? LIQUID NITROGEN

## 2022-09-07 PROCEDURE — 99213 OFFICE O/P EST LOW 20 MIN: CPT | Mod: 25

## 2022-09-07 PROCEDURE — 17000 DESTRUCT PREMALG LESION: CPT | Mod: 59

## 2022-09-07 PROCEDURE — 17110 DESTRUCTION B9 LES UP TO 14: CPT

## 2022-09-07 PROCEDURE — ? PRESCRIPTION

## 2022-09-07 PROCEDURE — ? COUNSELING

## 2022-09-07 PROCEDURE — 17003 DESTRUCT PREMALG LES 2-14: CPT | Mod: 59

## 2022-09-07 RX ORDER — TRIAMCINOLONE ACETONIDE 1 MG/G
THIN LAYER CREAM TOPICAL BID
Qty: 80 | Refills: 1 | Status: ERX | COMMUNITY
Start: 2022-09-07

## 2022-09-07 RX ADMIN — TRIAMCINOLONE ACETONIDE THIN LAYER: 1 CREAM TOPICAL at 00:00

## 2022-09-07 ASSESSMENT — LOCATION SIMPLE DESCRIPTION DERM
LOCATION SIMPLE: LEFT CHEEK
LOCATION SIMPLE: RIGHT ZYGOMA
LOCATION SIMPLE: RIGHT FOREHEAD
LOCATION SIMPLE: UPPER BACK
LOCATION SIMPLE: NOSE

## 2022-09-07 ASSESSMENT — LOCATION DETAILED DESCRIPTION DERM
LOCATION DETAILED: SUPERIOR THORACIC SPINE
LOCATION DETAILED: RIGHT MEDIAL ZYGOMA
LOCATION DETAILED: NASAL SUPRATIP
LOCATION DETAILED: RIGHT SUPERIOR FOREHEAD
LOCATION DETAILED: LEFT CENTRAL MALAR CHEEK

## 2022-09-07 ASSESSMENT — LOCATION ZONE DERM
LOCATION ZONE: TRUNK
LOCATION ZONE: FACE
LOCATION ZONE: NOSE

## 2022-09-07 NOTE — PROCEDURE: MIPS QUALITY
Quality 226: Preventive Care And Screening: Tobacco Use: Screening And Cessation Intervention: Patient screened for tobacco use and is an ex/non-smoker
Quality 130: Documentation Of Current Medications In The Medical Record: Current Medications Documented
Quality 111:Pneumonia Vaccination Status For Older Adults: Pneumococcal Vaccination Previously Received
Detail Level: Detailed
normal latch/lips widely flanged

## 2022-09-07 NOTE — PROCEDURE: LIQUID NITROGEN
Spray Paint Technique: No
Medical Necessity Clause: This procedure was medically necessary because the lesions that were treated were:
Show Applicator Variable?: Yes
Spray Paint Text: The liquid nitrogen was applied to the skin utilizing a spray paint frosting technique.
Post-Care Instructions: I reviewed with the patient in detail post-care instructions. Patient is to wear sunprotection, and avoid picking at any of the treated lesions. Pt may apply Vaseline to crusted or scabbing areas.
Detail Level: Detailed
Medical Necessity Information: It is in your best interest to select a reason for this procedure from the list below. All of these items fulfill various CMS LCD requirements except the new and changing color options.
Consent: The patient's mom’s consent was obtained including but not limited to risks of crusting, scabbing, blistering, scarring, darker or lighter pigmentary change, recurrence, incomplete removal and infection.
Consent: The patient's consent was obtained including but not limited to risks of crusting, scabbing, blistering, scarring, darker or lighter pigmentary change, recurrence, incomplete removal and infection.
Duration Of Freeze Thaw-Cycle (Seconds): 3
Number Of Freeze-Thaw Cycles: 2 freeze-thaw cycles

## 2022-09-22 ENCOUNTER — PHYSICAL THERAPY (OUTPATIENT)
Dept: PHYSICAL THERAPY | Facility: REHABILITATION | Age: 82
End: 2022-09-22
Attending: PHYSICAL MEDICINE & REHABILITATION
Payer: COMMERCIAL

## 2022-09-22 DIAGNOSIS — M51.36 DDD (DEGENERATIVE DISC DISEASE), LUMBAR: ICD-10-CM

## 2022-09-22 DIAGNOSIS — M41.80 DEXTROSCOLIOSIS: ICD-10-CM

## 2022-09-22 DIAGNOSIS — M54.50 LUMBOSACRAL PAIN: ICD-10-CM

## 2022-09-22 DIAGNOSIS — M47.816 LUMBAR SPONDYLOSIS: ICD-10-CM

## 2022-09-22 PROCEDURE — 97110 THERAPEUTIC EXERCISES: CPT

## 2022-09-22 PROCEDURE — 97161 PT EVAL LOW COMPLEX 20 MIN: CPT

## 2022-09-22 ASSESSMENT — ENCOUNTER SYMPTOMS
PAIN SCALE AT HIGHEST: 7
PAIN SCALE: 0

## 2022-09-22 NOTE — OP THERAPY EVALUATION
Outpatient Physical Therapy  INITIAL EVALUATION    [facility] [place]  [address]  [place] [address]  Phone:  [phone]  Fax:  [phone]    Date of Evaluation: 2022    Patient: Amber Roach  YOB: 1940  MRN: [id number]     Referring Provider: Gustavo Aguilar M.D.  [address]  [address]  [place] [address]   Referring Diagnosis Dextroscoliosis [M41.80];DDD (degenerative disc disease), lumbar [M51.36];Lumbar spondylosis [M47.816];Lumbosacral pain [M54.50]     Time Calculation  Start time: 1405  Stop time: 1453 Time Calculation (min): 48 minutes       Chief Complaint: back problem/ R hip problem    Visit Diagnoses     ICD-10-CM   1. Dextroscoliosis  M41.80   2. DDD (degenerative disc disease), lumbar  M51.36   3. Lumbar spondylosis  M47.816   4. Lumbosacral pain  M54.50       Date of onset of impairment: 2022 at least for months or greater    Subjective:   History of Present Illness:     Mechanism of injury:  Amber Roach is a 81 y.o. female that presents to therapy with low back pain. Reported symptoms came on with insidious onset. Her pain is located along her low back without descending down the back. She has a relevant history of R MIA and bilateral TKA. Patient denies fevers/chills, numbness/weakness of lower extremities, bowel/bladder incontinence, saddle anesthesia.       Aggravating factors: working in the yard, slight bending, bigger days (on her feet more/ walking more)  Relieving factors: laying down, sitting    ADL limitations:pain with functional activity/ mobility.     Pain:     Current pain ratin    At worst pain ratin    Past Medical History:   Diagnosis Date    Abnormal blood chemistry 2012    Abnormal electrocardiogram 2012    Breast cancer (HCC) 2012    Hyperlipidemia 2012    Hypertension 2012    Macular degeneration of right eye 2017    Vitamin d deficiency 2012     Past Surgical  History:   Procedure Laterality Date    ARTHROPLASTY Right 06/2020    right replacement    CHOLECYSTECTOMY  09/2013    LIVER BIOPSY  2013    PBC autoimmmune    LUMPECTOMY Right 01/1994    lumpectomy for cancer    ABDOMINAL HYSTERECTOMY TOTAL  09/1993    APPENDECTOMY  1953    KNEE ARTHROSCOPY Bilateral 1995 and 1996    OTHER      appendectomy 1952 right knee replacement 1995 liver biopsy 2013 hysterectomy 1993 left knee replacement 1997 breast lumpectomy 1994 cholecystectomy 2013     Social History     Tobacco Use    Smoking status: Never    Smokeless tobacco: Never   Substance Use Topics    Alcohol use: Yes     Alcohol/week: 0.6 oz     Types: 1 Glasses of wine per week     Comment: daily     Family and Occupational History     Socioeconomic History    Marital status:      Spouse name: Not on file    Number of children: Not on file    Years of education: Not on file    Highest education level: Not on file   Occupational History    Not on file       Objective   Hip Screen   Hip range of motion:WFL with the following exceptions:, limited extension not past nuetral for R hip in standing or side lying   Hip strength: Flexion Right 4/5 Flexion Left: 4+/5  Abduction Right:4/5, Abduction Left:4+/5  Adduction Right:4/5, Adduction Left:5/5  Extension Right 4/5, Extension Left:4/5  External rotation on R 4/5    Neurological Testing     Reflexes   DNT  Myotome testing   Lumbar (left)     L2 (hip flexors): 4+/5  L3 (knee extensors):5/5  L4 (ankle dorsiflexors): 5/5  L5 (great toe extension): 5/5  S1 (ankle plantar flexors): 5/5    Lumbar (right)     L2 (hip flexors): 4/5  L3 (knee extensors):5/5  L4 (ankle dorsiflexors): 5/5  L5 (great toe extension): 5/5  S1 (ankle plantar flexors): 5/5    Dermatome testing   INTACT Bilaterally    Palpation   Nontender to palpation     Active Range of Motion Spinal:  Flexion: WNL  Extension: to neutral with reported discomfort  Left lateral flexion: WNL  Right lateral flexion: WNL with  reported discomfort  Left rotation: 50% of WNL  Right rotation: 50% of WNL    Strength:      Abdominals   Lower abdominals: unable to initiate contraction/ poor pelvic tilt mobility.     Back   Flexion: 4+/5  Extension DNT, 3+/5 or greater      Hip, knee and ankle strength documented above in neuro screen    Tests     Lumbar spine     Slump:DNT   Thigh thrust:negative  Sacral rotation:DNT  Gaenslen's: DNT  SI compression: negative  SI Distraction:negative   SLR: DNT  Quadrant: DNT    General Comments     Gait   Shortened step, slight wide base          Therapeutic Exercises (CPT 34858):       Therapeutic Exercise Summary: HEP instruction/performance and development. Handout provided and exercises located below:  Access Code: [email or web address]  URL: [email or web address]  Date: 09/22/2022  Prepared by: Celestino Long    Exercises        Sit to Stand - 2 x daily - 5 reps      Supine Gluteal Sets - 2 x daily - 10 reps - 5 hold      Modified Liam Stretch - 2 x daily - 1-2 reps - 30 hold    Time-based treatments/modalities:    Physical Therapy Timed Treatment Charges  Therapeutic exercise minutes (CPT 24887): 10 minutes      Assessment, Response and Plan:   Assessment details:  Amber Roach is a 81 y.o. female with signs and symptoms consistent with with frequent paraspinal strain secondary to prolonged flexion postures associated with hip flexor insufficiency. She requires skilled physical therapy intervention to decrease pain, increase range of motion, increase functional mobility, improve ADL completion and establish a home exercise program.  Goals:   Short Term Goals:   1. Patient will be Independent with prescribed Home Exercise Program (HEP) and will be able to demonstrate exercises without cues for improved overall symptoms/activity tolerance.   2. Pt will improve proximal hip strength to 4+/5 or greater for improved standing support and stability.  Short term goal time span:  2-4 weeks      Long Term Goals:     3. Pt will improve ability to walk and bend forward as part of ADLS for cleaning/ dressing with pain less than 2/10  4. Pt will improve LBDI score to less than 26% indicative of improved function and reduced perceived disability.  Long term goal time span:  4-6 weeks    Plan:   Planned therapy interventions:  Therapeutic Exercise (CPT 80131), Manual Therapy (CPT 50719), Neuromuscular Re-education (CPT 53040) and E Stim Unattended (CPT 75393)  Frequency: 1-2x/week.  Duration in weeks:  6  Discussed with:  Patient    Functional Assessment Used  PT Functional Assessment Tool Used: LBDQ  PT Functional Assessment Score: 36%     Referring provider co-signature:  I have reviewed this plan of care and my co-signature certifies the need for services.    Certification Period: 09/22/2022 to  11/03/22    Physician Signature: ________________________________ Date: ______________

## 2022-10-04 NOTE — OP THERAPY DAILY TREATMENT
"  Outpatient Physical Therapy  DAILY TREATMENT     [facility] [place]  [address], [address]  [place] [facility] [place] [address]  Phone:  [phone]  Fax:  [phone]    Date: 10/05/2022    Patient: Amber Roach  YOB: 1940  MRN: [id number]     Time Calculation                   Chief Complaint: No chief complaint on file.    Visit #: 2    SUBJECTIVE:  ***    OBJECTIVE:  Current objective measures: ***          Therapeutic Exercises (CPT 52365):       Therapeutic Exercise Summary: HEP instruction/performance and development. Handout provided and exercises located below:  Access Code: [email or web address]  URL: [email or web address]  Date: 09/22/2022  Prepared by: Celestino Long    Exercises        Sit to Stand - 2 x daily - 5 reps      Supine Gluteal Sets - 2 x daily - 10 reps - 5 hold      Modified Liam Stretch - 2 x daily - 1-2 reps - 30 hold    Time-based treatments/modalities:           Pain rating (1-10) before treatment:  {PAIN NUMBERS_1-10:75205}  Pain rating (1-10) after treatment:  {PAIN NUMBERS_1-10:69620}    ASSESSMENT:   Response to treatment: ***    PLAN/RECOMMENDATIONS:   Plan for treatment: {AMB OP THERAPY - THERAPY PLAN:519001902::\"therapy treatment to continue next visit\"}.  Planned interventions for next visit: {PT PLANNED THERAPY INTERVENTIONS:452708617::\"continue with current treatment\"}.         "

## 2022-10-05 ENCOUNTER — APPOINTMENT (OUTPATIENT)
Dept: PHYSICAL THERAPY | Facility: REHABILITATION | Age: 82
End: 2022-10-05
Attending: PHYSICAL MEDICINE & REHABILITATION
Payer: COMMERCIAL

## 2022-10-13 ENCOUNTER — OFFICE VISIT (OUTPATIENT)
Dept: PHYSICAL MEDICINE AND REHAB | Facility: MEDICAL CENTER | Age: 82
End: 2022-10-13
Payer: COMMERCIAL

## 2022-10-13 VITALS
HEIGHT: 66 IN | HEART RATE: 62 BPM | TEMPERATURE: 97.2 F | DIASTOLIC BLOOD PRESSURE: 64 MMHG | OXYGEN SATURATION: 96 % | SYSTOLIC BLOOD PRESSURE: 112 MMHG | BODY MASS INDEX: 25.26 KG/M2 | WEIGHT: 157.2 LBS

## 2022-10-13 DIAGNOSIS — M43.16 SPONDYLOLISTHESIS OF LUMBAR REGION: ICD-10-CM

## 2022-10-13 DIAGNOSIS — M47.816 LUMBAR SPONDYLOSIS: ICD-10-CM

## 2022-10-13 DIAGNOSIS — M51.36 DDD (DEGENERATIVE DISC DISEASE), LUMBAR: ICD-10-CM

## 2022-10-13 PROCEDURE — 99214 OFFICE O/P EST MOD 30 MIN: CPT | Performed by: PHYSICAL MEDICINE & REHABILITATION

## 2022-10-13 RX ORDER — CHLORHEXIDINE GLUCONATE ORAL RINSE 1.2 MG/ML
SOLUTION DENTAL
COMMUNITY
Start: 2022-09-20

## 2022-10-13 RX ORDER — TRIAMCINOLONE ACETONIDE 1 MG/G
CREAM TOPICAL
COMMUNITY
Start: 2022-09-07

## 2022-10-13 ASSESSMENT — PATIENT HEALTH QUESTIONNAIRE - PHQ9: CLINICAL INTERPRETATION OF PHQ2 SCORE: 0

## 2022-10-13 ASSESSMENT — PAIN SCALES - GENERAL: PAINLEVEL: 2=MINIMAL-SLIGHT

## 2022-10-13 NOTE — PROGRESS NOTES
Follow-up patient note    Physiatry (physical medicine and  Rehabilitation), interventional spine and sports medicine    Date of Service: 10/13/2022    Chief complaint:   Chief Complaint   Patient presents with    Follow-Up     Back pain       HISTORY    HPI: Amber Roach 81 y.o. 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  This is primarily axial.  This seems to come on with activity.  Sitting, usually, pain is not present.    Reports that she has been taking tylenol usually not every day.  Pain is a little bit less.  Cannot start PT until November.  She did have her initial evaluation with the night, PT and reports that she has started doing some exercises at home.  She does get some cramping when she does the modified Larkin stretch.  As result she is not doing this morning.    Today, her pain is aching, 1/10 on the NRS.  Tends to depending on activity.      Trying to walk more.     Medical records review:  I reviewed the note from the referring provider Dav Wheeler III, M.* dated 06/27/2022.      Previous treatments:    Physical Therapy: Yes, but not for her back    Medications the patient is tried: tylenol and tramadol    Previous interventions: none recently    Previous surgeries to relieve the above pain:  no back surgeries      ROS:   Red Flags ROS:   Fever, Chills, Sweats: Denies  Involuntary Weight Loss: Denies  Bladder Incontinence: Denies  Bowel Incontinence: Denies  Saddle Anesthesia: Denies    All other systems reviewed and negative.       PMHx:   Past Medical History:   Diagnosis Date    Abnormal blood chemistry 2/8/2012    Abnormal electrocardiogram 2/8/2012    Breast cancer (HCC) 2/8/2012    Hyperlipidemia 2/8/2012    Hypertension 2/8/2012    Macular degeneration of right eye 2017    Vitamin d deficiency 2/8/2012       PSHx:   Past Surgical History:   Procedure Laterality Date    ARTHROPLASTY Right 06/2020     right replacement    CHOLECYSTECTOMY  09/2013    LIVER BIOPSY  2013    PBC autoimmmune    LUMPECTOMY Right 01/1994    lumpectomy for cancer    ABDOMINAL HYSTERECTOMY TOTAL  09/1993    APPENDECTOMY  1953    KNEE ARTHROSCOPY Bilateral 1995 and 1996    OTHER      appendectomy 1952 right knee replacement 1995 liver biopsy 2013 hysterectomy 1993 left knee replacement 1997 breast lumpectomy 1994 cholecystectomy 2013       Family history   Family History   Problem Relation Age of Onset    Heart Disease Brother     Heart Attack Brother          Medications:   Current Outpatient Medications   Medication    esomeprazole (NEXIUM) 40 MG delayed-release capsule    valsartan-hydrochlorothiazide (DIOVAN-HCT) 320-12.5 MG per tablet    carvedilol (COREG) 12.5 MG Tab    atorvastatin (LIPITOR) 20 MG Tab    ibandronate (BONIVA) 150 MG tablet    Omega-3 Fatty Acids (FISH OIL CONCENTRATE PO)    Calcium Carbonate-Vitamin D (CALCIUM 500 + D PO)    Multiple Vitamins-Minerals (PRESERVISION AREDS 2 PO)    Multiple Vitamin (MULTI-VITAMIN) TABS    Cholecalciferol (VITAMIN D) 1000 UNIT CAPS    chlorhexidine (PERIDEX) 0.12 % Solution    triamcinolone acetonide (KENALOG) 0.1 % Cream    benzonatate (TESSALON) 200 MG capsule     No current facility-administered medications for this visit.       Allergies:   Allergies   Allergen Reactions    Amlodipine      hives    Ampicillin     Penicillins        Social Hx:   Social History     Socioeconomic History    Marital status:      Spouse name: Not on file    Number of children: Not on file    Years of education: Not on file    Highest education level: Not on file   Occupational History    Not on file   Tobacco Use    Smoking status: Never    Smokeless tobacco: Never   Vaping Use    Vaping Use: Never used   Substance and Sexual Activity    Alcohol use: Yes     Alcohol/week: 0.6 oz     Types: 1 Glasses of wine per week     Comment: daily    Drug use: No    Sexual activity: Not Currently   Other  "Topics Concern    Not on file   Social History Narrative    Not on file     Social Determinants of Health     Financial Resource Strain: Not on file   Food Insecurity: Not on file   Transportation Needs: Not on file   Physical Activity: Not on file   Stress: Not on file   Social Connections: Not on file   Intimate Partner Violence: Not on file   Housing Stability: Not on file         EXAMINATION     Physical Exam:   Vitals: /64 (BP Location: Left arm, Patient Position: Sitting, BP Cuff Size: Adult)   Pulse 62   Temp 36.2 °C (97.2 °F) (Temporal)   Ht 1.676 m (5' 6\")   Wt 71.3 kg (157 lb 3.2 oz)   SpO2 96%     Constitutional:   Body Habitus: Body mass index is 25.37 kg/m².  Cooperation: Fully cooperates with exam  Appearance: Well-groomed, well-nourished, not disheveled, in no acute distress    Eyes: No scleral icterus, no proptosis     ENT -no obvious auditory deficits, wearing a face mask     Skin -no rashes or lesions noted     Respiratory-  breathing comfortable on room air, no audible wheezing    Cardiovascular-No lower extremity edema is noted.     Psychiatric- alert and oriented ×3. Normal affect.     Gait - normal gait, no use of ambulatory device, nonantalgic.     Musculoskeletal -     Thoracic/Lumbar Spine/Sacral Spine/Hips   Inspection: No evidence of atrophy in bilateral lower extremities throughout     Lumbar spine Special tests  Neuro tension  Seated straight leg test negative bilaterally        Neuro       Key points for the international standards for neurological classification of spinal cord injury (ISNCSCI) to light touch.     Dermatome R L   L2 2 2   L3 2 2   L4 2 2   L5 2 2   S1 2 2   S2 2 2       Motor Exam Lower Extremities    ? Myotome R L   Hip flexion L2 5 5   Knee extension L3 5 5   Ankle dorsiflexion L4 5 5   Toe extension L5 5- 5   Ankle plantarflexion S1 5 5       Reflexes  ?  R L   Patella  absent absent   Achilles   2+ 2+       MEDICAL DECISION MAKING    Medical records " review: see under HPI section.     DATA    Labs:   06/02/2022; 130 Na, BUN 12, Cr 0.96, AST 36, GFR 55    Lab Results   Component Value Date/Time    SODIUM 131 (L) 08/27/2021 12:21 PM    POTASSIUM 4.5 08/27/2021 12:21 PM    CHLORIDE 95 (L) 08/27/2021 12:21 PM    CO2 23 08/27/2021 12:21 PM    GLUCOSE 94 08/27/2021 12:21 PM    BUN 15 08/27/2021 12:21 PM    CREATININE 0.88 08/27/2021 12:21 PM    CALCIUM 9.5 08/27/2021 12:21 PM    ALBUMIN 4.2 08/27/2021 12:21 PM       No results found for: PROTHROMBTM, INR     Lab Results   Component Value Date/Time    RBC 4.08 06/19/2020 02:00 PM    HEMOGLOBIN 9.6 (L) 06/25/2020 04:28 AM    HEMATOCRIT 28.4 (L) 06/25/2020 04:28 AM    MCV 98.1 06/19/2020 02:00 PM    MCH 32.9 06/19/2020 02:00 PM    MCHC 33.5 06/19/2020 02:00 PM    MPV 6.3 (L) 06/19/2020 02:00 PM    NEUTSPOLYS 65 06/19/2020 02:00 PM    LYMPHOCYTES 23 06/19/2020 02:00 PM    MONOCYTES 9 06/19/2020 02:00 PM    EOSINOPHILS 3 06/19/2020 02:00 PM    BASOPHILS 1 06/19/2020 02:00 PM        Lab Results   Component Value Date/Time    HBA1C 5.5 06/19/2020 02:00 PM        Imaging: I personally reviewed following images, these are my reads  X-ray lumbar spine August 26, 2022  There is grade 1 anterolisthesis of L5 on S1.  There is no significant instability noted.  Diffuse multilevel degenerative disc disease and multilevel lumbar spondylosis.    MRI lumbar spine 12/19/2020  Lumbar dextroscoliosis.  At L1-2, disc height loss, no central or foraminal stenosis  At L2-3, disc height loss, facet arthropathy, no central or foraminal stenosis  At L3-4, disc height loss, facet arthropathy, mild foraminal stenosis bilaterally, no central canal stenosis  At L4-5, disc height loss, facet arthropathy, mild central canal stenosis, mild right foraminal stenosis  At L5-S1, grade I anterolisthesis, bilateral face arthropathy, mild foraminal stenosis bilaterally, no central stenosis        IMAGING radiology reads. I reviewed the following radiology  reads     X-ray lumbar spine August 26, 2022     IMPRESSION:        1.  Severe multilevel lumbar spondylosis with no acute fracture noted.  2.  Grade 1 anterolisthesis of L5 on S1. No radiographic evidence of instability.                       MR-LUMBAR SPINE-W/O [facility] 12/19/2020                                                                                     Diagnosis   Visit Diagnoses     ICD-10-CM   1. Spondylolisthesis of lumbar region  M43.16   2. DDD (degenerative disc disease), lumbar  M51.36   3. Lumbar spondylosis  M47.816             ASSESSMENT:  Amber Roach 81 y.o. female seen for above     Amber was seen today for follow-up.    Diagnoses and all orders for this visit:    Spondylolisthesis of lumbar region    DDD (degenerative disc disease), lumbar    Lumbar spondylosis      Continue with plan for PT.  Discussed avoiding exercises that seem to aggravate her back pain.  Reviewed x-rays of the lumbar spine and noted no instability at L5-S1.  She is taking minimal tramadol at this time, usually no more than once and not every day.  She will continue to limit this as tolerated.  Encouraged activity as tolerated.  Discussed that we can consider other treatment options if symptoms worsen.  Plan to recheck after PT and/or if symptoms should progress      Follow-up: Return in about 2 months (around 12/13/2022), or if symptoms worsen or fail to improve.      Thank you very much for asking me to participate in Amber Roach's care.  Please contact me with any questions or concerns.    Please note that this dictation was created using voice recognition software. I have made every reasonable attempt to correct obvious errors but there may be errors of grammar and content that I may have overlooked prior to finalization of this note.      Gustavo Aguilar MD  Physical Medicine and Rehabilitation  Interventional Spine and Sports Physiatry  [facility]           Dav Mackay III, M.

## 2022-11-01 ENCOUNTER — PHYSICAL THERAPY (OUTPATIENT)
Dept: PHYSICAL THERAPY | Facility: REHABILITATION | Age: 82
End: 2022-11-01
Attending: PHYSICAL MEDICINE & REHABILITATION
Payer: COMMERCIAL

## 2022-11-01 DIAGNOSIS — M54.50 LUMBOSACRAL PAIN: ICD-10-CM

## 2022-11-01 DIAGNOSIS — M47.816 LUMBAR SPONDYLOSIS: ICD-10-CM

## 2022-11-01 DIAGNOSIS — M41.80 DEXTROSCOLIOSIS: ICD-10-CM

## 2022-11-01 DIAGNOSIS — M51.36 DDD (DEGENERATIVE DISC DISEASE), LUMBAR: ICD-10-CM

## 2022-11-01 PROCEDURE — 97110 THERAPEUTIC EXERCISES: CPT

## 2022-11-01 NOTE — OP THERAPY DAILY TREATMENT
Outpatient Physical Therapy  DAILY TREATMENT     [facility] [place]  [address], [address]  [place] [address]  Phone:  [phone]  Fax:  [phone]    Date: 11/01/2022    Patient: Amber Roach  YOB: 1940  MRN: [id number]     Time Calculation    Start time: 1330  Stop time: 1415 Time Calculation (min): 45 minutes         Chief Complaint: back problem    Visit #: 2    SUBJECTIVE:  Notes improved pain with mild compliance of HEP. Overall not getting worse. Feels that her biggest limitations is not being able to/ having difficulty with bending/gardening/ cleaning around the home.     OBJECTIVE:  Current objective measures: lumbar flexion limited with forward bend to knees. Back extension to neutral with reported discomfort. Rotation 50% bilateral. Hip flexion MMT 2-/5 R, 3+/5 L          Therapeutic Exercises (CPT 96258):     1. nustep, lvl 3 x 6min    2. ankle rocker, x 60    3. Shuttle, 3c x 2min    4. Seated pelvic tilt, on disk x 30    5. Supine ball rolls bracing, x 60    6. Supin lumbar mini twists, ball x 60    7. Seted marches, x 20    8. sit to stand, x 5, eccentric focued    Time-based treatments/modalities:    Physical Therapy Timed Treatment Charges  Therapeutic exercise minutes (CPT 88599): 45 minutes      Pain rating (1-10) before treatment:  1  Pain rating (1-10) after treatment:  1    ASSESSMENT:   Response to treatment: Overall tolerated treatment well during session. General activity limited limited but managed well. F/u in 2 days for UPOC and HEP progression.     PLAN/RECOMMENDATIONS:   Plan for treatment: therapy treatment to continue next visit.  Planned interventions for next visit: continue with current treatment.

## 2022-11-03 ENCOUNTER — PHYSICAL THERAPY (OUTPATIENT)
Dept: PHYSICAL THERAPY | Facility: REHABILITATION | Age: 82
End: 2022-11-03
Attending: PHYSICAL MEDICINE & REHABILITATION
Payer: COMMERCIAL

## 2022-11-03 DIAGNOSIS — M47.816 LUMBAR SPONDYLOSIS: ICD-10-CM

## 2022-11-03 DIAGNOSIS — M51.36 DDD (DEGENERATIVE DISC DISEASE), LUMBAR: ICD-10-CM

## 2022-11-03 DIAGNOSIS — M41.80 DEXTROSCOLIOSIS: ICD-10-CM

## 2022-11-03 PROCEDURE — 97014 ELECTRIC STIMULATION THERAPY: CPT

## 2022-11-03 PROCEDURE — 97110 THERAPEUTIC EXERCISES: CPT

## 2022-11-03 NOTE — OP THERAPY DAILY TREATMENT
Outpatient Physical Therapy  DAILY TREATMENT     [facility] [place]  [address]  [place] [address]  Phone:  [phone]  Fax:  [phone]    Date: 11/03/2022    Patient: Amber Roach  YOB: 1940  MRN: [id number]     Time Calculation    Start time: 1330  Stop time: 0215 Time Calculation (min): 765 minutes       Chief Complaint: back problem    Visit #: 3    SUBJECTIVE:  No particular changes noted since last session 2 days ago. Reports overall improved tolerance to movement/ motion and baseline pain.   From last visit: Feels that her biggest limitations is not being able to/ having difficulty with bending/gardening/ cleaning around the home.     OBJECTIVE:  Current objective measures: lumbar flexion limited with forward bend to knees. Back extension to neutral with reported discomfort. Rotation 50% bilateral. Hip flexion MMT 2-/5 R, 3+/5 L, LBDQ 18%          Therapeutic Exercises (CPT 56247):     1. nustep, lvl 2 x 6min    2. ankle rocker, x 60    3. Shuttle, 3c x 2min, NT    4. Seated pelvic tilt, on disk x 30, NT    5. Supine ball rolls bracing, x 60    6. Supin lumbar mini twists, ball x 60    7. Seated marches, x 20    8. sit to stand, x10, 34 seconds    Therapeutic Treatments and Modalities:     1. E Stim Unattended (CPT 60000), IFC and MHP to lumbar spine x 15min supine  Time-based treatments/modalities:    Physical Therapy Timed Treatment Charges  Therapeutic exercise minutes (CPT 53661): 30 minutes      Pain rating (1-10) before treatment:  1  Pain rating (1-10) after treatment:  1    ASSESSMENT:   Response to treatment: Overall tolerated treatment well during session. General activity limited limited but managed well.UPOC attached.     PLAN/RECOMMENDATIONS:   Plan for treatment: therapy treatment to continue next visit.  Planned interventions for next visit: continue with current treatment.

## 2022-11-04 NOTE — OP THERAPY PROGRESS SUMMARY
Outpatient Physical Therapy  PROGRESS SUMMARY NOTE      [facility] [place]  [address]  [facility] [address]  Phone:  [phone]  Fax:  [phone]    Date of Visit: 11/03/2022    Patient: Amber Roach  YOB: 1940  MRN: [id number]     Referring Provider: Gustavo Aguilar M.D.  [id number]  [address]  [place] [id number]   Referring Diagnosis Other forms of scoliosis, site unspecified [M41.80];Other intervertebral disc degeneration, lumbar region [M51.36]     Visit Diagnoses     ICD-10-CM   1. Dextroscoliosis  M41.80   2. DDD (degenerative disc disease), lumbar  M51.36   3. Lumbar spondylosis  M47.816       Rehab Potential: good    Progress Report Period: through 11/03/22      Functional Assessment Used  LBDQ 18%        Progress Summary Details    Objective Findings and Assessment  Amber Roach has completed 3 physical therapy sessions on her current prescription. She has improved function, decreased  but consistent pain, consistent strength, consistent ROM, and she continues to progress with her home exercise program. Recommend continued physical therapy in order to further improve function, decrease pain, improve strength, and increase ROM and progress patient to full independent home exercise program. Thank you for the opportunity to assist you and your patient.      Details: lumbar flexion limited with forward bend to knees. Back extension to neutral with reported discomfort. Rotation 50% bilateral. Hip flexion MMT 2-/5 R, 3+/5 L      Goals:   Short Term Goals:   1. Patient will be Independent with prescribed Home Exercise Program (HEP) and will be able to demonstrate exercises without cues for improved overall symptoms/activity tolerance.   3. Pt will improve proximal hip strength to 4+/5 or greater for improved standing support and stability.  Short term goal time span:  2-4 weeks      Long Term Goals:    3. Pt will improve ability to walk and  bend forward as part of ADLS for cleaning/ dressing with pain less than 2/10  4. Pt will improve LBDI score to less than 15% indicative of improved function and reduced perceived disability.  Long term goal time span:  4-6 weeks       Plan:   Planned therapy interventions:  Therapeutic Exercise (CPT 18613), Neuromuscular Re-education (CPT 94859), Manual Therapy (CPT 05311), and E Stim Unattended (CPT 26207)  Frequency:  1-2x/week  Duration in weeks:  6      Referring provider co-signature:  I have reviewed this plan of care and my co-signature certifies the need for services.     Certification Period: 11/03/2022 to 12/15/22    Physician Signature: ________________________________ Date: ______________

## 2022-11-08 ENCOUNTER — PHYSICAL THERAPY (OUTPATIENT)
Dept: PHYSICAL THERAPY | Facility: REHABILITATION | Age: 82
End: 2022-11-08
Attending: PHYSICAL MEDICINE & REHABILITATION
Payer: COMMERCIAL

## 2022-11-08 DIAGNOSIS — M47.816 LUMBAR SPONDYLOSIS: ICD-10-CM

## 2022-11-08 DIAGNOSIS — M41.80 DEXTROSCOLIOSIS: ICD-10-CM

## 2022-11-08 DIAGNOSIS — M51.36 DDD (DEGENERATIVE DISC DISEASE), LUMBAR: ICD-10-CM

## 2022-11-08 DIAGNOSIS — M54.50 LUMBOSACRAL PAIN: ICD-10-CM

## 2022-11-08 PROCEDURE — 97110 THERAPEUTIC EXERCISES: CPT

## 2022-11-08 NOTE — OP THERAPY DAILY TREATMENT
Outpatient Physical Therapy  DAILY TREATMENT     [facility] [place]  [address]  [facility] [place] [facility] [address]  Phone:  [phone]  Fax:  [phone]    Date: 11/08/2022    Patient: Amber Roach  YOB: 1940  MRN: [id number]     Time Calculation    Start time: 1105  Stop time: 1140 Time Calculation (min): 35 minutes       Chief Complaint: back problem    Visit #: 4    SUBJECTIVE:  Again no particular changes reported. No pain with exercises at home and    From last visit: Feels that her biggest limitations is not being able to/ having difficulty with bending/gardening/ cleaning around the home.     OBJECTIVE:  Current objective measures: lumbar flexion limited with forward bend to knees. Back extension to neutral with reported discomfort. Rotation 50% bilateral. Hip flexion MMT 2-/5 R, 3+/5 L, LBDQ 18%          Therapeutic Exercises (CPT 74849):     1. nustep, lvl 3 x 6min    2. ankle rocker, x 60    3. Shuttle, 3c x 2min, NT    4. Seated pelvic tilt, on disk x 30    5. Supine ball rolls bracing, x 60    6. Supine lumbar mini twists, ball x 60    7. Seated marches, x 20    8. sit to stand, x10    9. Standing paloff press, blk band 2 sec holds, 2x 12 each side.    10. bolster bridge, 2 sec holds x 15    20. UPOC 12/15/22    Time-based treatments/modalities:    Physical Therapy Timed Treatment Charges  Therapeutic exercise minutes (CPT 86447): 35 minutes      Pain rating (1-10) before treatment:  1  Pain rating (1-10) after treatment:  1    ASSESSMENT:   Response to treatment: treatment stopped due to worsening road conditions. Paloff press to be added to HEP at next session if tolerated later on today. Overall demonstrated no worsening pain with exercises or movement during today's session.     PLAN/RECOMMENDATIONS:   Plan for treatment: therapy treatment to continue next visit.  Planned interventions for next visit: continue with current treatment.

## 2022-11-10 ENCOUNTER — APPOINTMENT (OUTPATIENT)
Dept: PHYSICAL THERAPY | Facility: REHABILITATION | Age: 82
End: 2022-11-10
Attending: PHYSICAL MEDICINE & REHABILITATION
Payer: COMMERCIAL

## 2022-11-15 ENCOUNTER — APPOINTMENT (OUTPATIENT)
Dept: PHYSICAL THERAPY | Facility: REHABILITATION | Age: 82
End: 2022-11-15
Attending: PHYSICAL MEDICINE & REHABILITATION
Payer: COMMERCIAL

## 2022-11-16 ENCOUNTER — PHYSICAL THERAPY (OUTPATIENT)
Dept: PHYSICAL THERAPY | Facility: REHABILITATION | Age: 82
End: 2022-11-16
Attending: PHYSICAL MEDICINE & REHABILITATION
Payer: COMMERCIAL

## 2022-11-16 DIAGNOSIS — M51.36 DDD (DEGENERATIVE DISC DISEASE), LUMBAR: ICD-10-CM

## 2022-11-16 DIAGNOSIS — M41.80 DEXTROSCOLIOSIS: ICD-10-CM

## 2022-11-16 DIAGNOSIS — M54.50 LUMBOSACRAL PAIN: ICD-10-CM

## 2022-11-16 DIAGNOSIS — M47.816 LUMBAR SPONDYLOSIS: ICD-10-CM

## 2022-11-16 PROCEDURE — 97110 THERAPEUTIC EXERCISES: CPT

## 2022-11-16 NOTE — OP THERAPY DAILY TREATMENT
Outpatient Physical Therapy  DAILY TREATMENT     [facility] [place]  [address]  [facility] [address]  Phone:  [phone]  Fax:  [phone]    Date: 11/16/2022    Patient: Amber Roach  YOB: 1940  MRN: [id number]     Time Calculation    Start time: 1122  Stop time: 1205 Time Calculation (min): 43 minutes       Chief Complaint: back problem    Visit #: 5    SUBJECTIVE:  Again no particular changes reported. No pain with exercises at home. Has not done any cleaning lately and that is what typically irritates her pain.     OBJECTIVE:  Current objective measures: lumbar flexion limited with forward bend to knees. Back extension to neutral with reported discomfort. Rotation 50% bilateral. Hip flexion MMT 2-/5 R, 3+/5 L, LBDQ 18%          Therapeutic Exercises (CPT 98670):     1. nustep, lvl 3 x 6min    2. ankle rocker, x 60    3. bolster bridge, 2 sec holds x 15    4. Seated pelvic tilt, on disk x 30    5. Supine ball rolls bracing, x 60    6. Supine lumbar mini twists, ball x 60    7. Seated marches, x 20    8. sit to stand, x8    9. Standing paloff press, blk band 2 sec holds, 2x 12 each side.    20. UPOC 12/15/22      Therapeutic Exercise Summary: HEP instruction/performance and development. Handout provided and exercises located below:  Access Code: [email or web address]  URL: [email or web address]  Date: 11/16/2022  Prepared by: Celestino Long    Exercises        Supine Gluteal Sets - 1 x daily - 10 reps - 5 hold      Sit to Stand - 2 x daily - 5-10 reps      Supine March - 2 x daily - 20 reps      Supine Lumbar Rotation - 1 x daily - 10-15 reps    Time-based treatments/modalities:    Physical Therapy Timed Treatment Charges  Therapeutic exercise minutes (CPT 99404): 43 minutes      Pain rating (1-10) before treatment:  1  Pain rating (1-10) after treatment:  1    ASSESSMENT:   Response to treatment: HEP slightly progressed/ reviewed for proper completion. Overall  demonstrated no worsening pain with exercises or movement during today's session. F/u next week. Symptoms continued to fit stenosis.      PLAN/RECOMMENDATIONS:   Plan for treatment: therapy treatment to continue next visit.  Planned interventions for next visit: continue with current treatment.

## 2022-11-17 ENCOUNTER — APPOINTMENT (OUTPATIENT)
Dept: PHYSICAL THERAPY | Facility: REHABILITATION | Age: 82
End: 2022-11-17
Attending: PHYSICAL MEDICINE & REHABILITATION
Payer: COMMERCIAL

## 2022-11-22 ENCOUNTER — PHYSICAL THERAPY (OUTPATIENT)
Dept: PHYSICAL THERAPY | Facility: REHABILITATION | Age: 82
End: 2022-11-22
Attending: PHYSICAL MEDICINE & REHABILITATION
Payer: COMMERCIAL

## 2022-11-22 DIAGNOSIS — M41.80 DEXTROSCOLIOSIS: ICD-10-CM

## 2022-11-22 DIAGNOSIS — M54.50 LUMBOSACRAL PAIN: ICD-10-CM

## 2022-11-22 DIAGNOSIS — M51.36 DDD (DEGENERATIVE DISC DISEASE), LUMBAR: ICD-10-CM

## 2022-11-22 DIAGNOSIS — M47.816 LUMBAR SPONDYLOSIS: ICD-10-CM

## 2022-11-22 PROCEDURE — 97110 THERAPEUTIC EXERCISES: CPT

## 2022-11-22 NOTE — OP THERAPY DAILY TREATMENT
Outpatient Physical Therapy  DAILY TREATMENT     [facility] [place]  [address]  [place] [facility] [address]  Phone:  [phone]  Fax:  [phone]    Date: 11/22/2022    Patient: Amber Roach  YOB: 1940  MRN: [id number]     Time Calculation    Start time: 1450  Stop time: 1530 Time Calculation (min): 40 minutes       Chief Complaint: back problem    Visit #: 6    SUBJECTIVE:    Notes that getting in her car/ lifting her R hip into car is painful/ difficult and has been since before her hip surgery. Notes that her back pain has been controlled and not worsening.      OBJECTIVE:  Current objective measures: lumbar flexion limited with forward bend to knees. Back extension to neutral with reported discomfort. Rotation 50% bilateral. Hip flexion MMT 2-/5 R, 3+/5 L, LBDQ 18%          Therapeutic Exercises (CPT 91006):     1. nustep, lvl 3 x 6min    2. ankle rocker, x 60    3. bolster bridge, 2 sec holds x 15    4. Seated pelvic tilt, on disk x 30    5. Supine ball rolls bracing, x 60    6. Supine lumbar mini twists, ball x 60    7. Seated marches, x 20    8. sit to stand, x8    9. Standing paloff press, blk band 2 sec holds, 2x 12 each side.    20. UPOC 12/15/22      Therapeutic Exercise Summary: HEP instruction/performance and development. Handout provided and exercises located below:  Access Code: [email or web address]  URL: [email or web address]  Date: 11/16/2022  Prepared by: Celestino Long    Exercises        Supine Gluteal Sets - 1 x daily - 10 reps - 5 hold      Sit to Stand - 2 x daily - 5-10 reps      Supine March - 2 x daily - 20 reps      Supine Lumbar Rotation - 1 x daily - 10-15 reps    Time-based treatments/modalities:    Physical Therapy Timed Treatment Charges  Therapeutic exercise minutes (CPT 66782): 40 minutes      Pain rating (1-10) before treatment:  1  Pain rating (1-10) after treatment:  1    ASSESSMENT:   Response to treatment: HEP  maintained. Overall  demonstrated no worsening pain with exercises or movement during today's session. F/u pending wait list.       PLAN/RECOMMENDATIONS:   Plan for treatment: therapy treatment to continue next visit.  Planned interventions for next visit: continue with current treatment.

## 2022-12-01 ENCOUNTER — PHYSICAL THERAPY (OUTPATIENT)
Dept: PHYSICAL THERAPY | Facility: REHABILITATION | Age: 82
End: 2022-12-01
Attending: PHYSICAL MEDICINE & REHABILITATION
Payer: COMMERCIAL

## 2022-12-01 DIAGNOSIS — M54.50 LUMBOSACRAL PAIN: ICD-10-CM

## 2022-12-01 DIAGNOSIS — M41.80 DEXTROSCOLIOSIS: ICD-10-CM

## 2022-12-01 DIAGNOSIS — M51.36 DDD (DEGENERATIVE DISC DISEASE), LUMBAR: ICD-10-CM

## 2022-12-01 DIAGNOSIS — M47.816 LUMBAR SPONDYLOSIS: ICD-10-CM

## 2022-12-01 PROCEDURE — 97110 THERAPEUTIC EXERCISES: CPT

## 2022-12-01 NOTE — OP THERAPY DAILY TREATMENT
Outpatient Physical Therapy  DAILY TREATMENT     [facility] Outpatient Physical Therapy [place]  [address], [address]  [place] [address]  Phone:  [phone]  Fax:  [phone]    Date: 12/01/2022    Patient: Amber Roach  YOB: 1940  MRN: [id number]     Time Calculation    Start time: 1115  Stop time: 1200 Time Calculation (min): 45 minutes       Chief Complaint: back problem    Visit #: 7    SUBJECTIVE:  No particular reported changes. Has been doing her HEP regularly at home.    OBJECTIVE:  Current objective measures: lumbar flexion limited with forward bend to knees. Back extension to neutral with reported discomfort. Rotation 50% bilateral. Hip flexion MMT 2-/5 R, 3+/5 L, LBDQ 18%          Therapeutic Exercises (CPT 43959):     1. nustep, lvl 3 x 7min    2. ankle rocker, x 60    3. bolster bridge, 2 sec holds x 15    4. Seated pelvic tilt, on disk x 30    5. Supine ball rolls bracing, x 60    6. Supine lumbar mini twists, ball x 60    7. Seated marches, x 20    8. sit to stand, x5    9. Standing paloff press, blk band 2 sec holds, 2x 12 each side.    10. assisted SLR, strap 2x 10, 1x 5    11. step up and down, 6in retro eccentric    20. UPOC 12/15/22      Therapeutic Exercise Summary: HEP instruction/performance and development. Handout provided and exercises located below:  Access Code: [email or web address]  URL: [email or web address]  Date: 11/16/2022  Prepared by: Celestino Long    Exercises        Supine Gluteal Sets - 1 x daily - 10 reps - 5 hold      Sit to Stand - 2 x daily - 5-10 reps      Supine March - 2 x daily - 20 reps      Supine Lumbar Rotation - 1 x daily - 10-15 reps    Time-based treatments/modalities:    Physical Therapy Timed Treatment Charges  Therapeutic exercise minutes (CPT 38950): 45 minutes      Pain rating (1-10) before treatment:  1  Pain rating (1-10) after treatment:  1    ASSESSMENT:   Response to treatment: HEP maintained. SLR strength/ tolerance to loading improving.  Overall demonstrated no worsening pain with exercises or movement during today's session. F/u  next week.        PLAN/RECOMMENDATIONS:   Plan for treatment: therapy treatment to continue next visit.  Planned interventions for next visit: continue with current treatment.

## 2022-12-07 ENCOUNTER — PHYSICAL THERAPY (OUTPATIENT)
Dept: PHYSICAL THERAPY | Facility: REHABILITATION | Age: 82
End: 2022-12-07
Attending: PHYSICAL MEDICINE & REHABILITATION
Payer: COMMERCIAL

## 2022-12-07 DIAGNOSIS — M51.36 DDD (DEGENERATIVE DISC DISEASE), LUMBAR: ICD-10-CM

## 2022-12-07 DIAGNOSIS — M41.80 DEXTROSCOLIOSIS: ICD-10-CM

## 2022-12-07 DIAGNOSIS — M47.816 LUMBAR SPONDYLOSIS: ICD-10-CM

## 2022-12-07 DIAGNOSIS — M54.50 LUMBOSACRAL PAIN: ICD-10-CM

## 2022-12-07 PROCEDURE — 97110 THERAPEUTIC EXERCISES: CPT

## 2022-12-07 NOTE — OP THERAPY DAILY TREATMENT
Outpatient Physical Therapy  DAILY TREATMENT     [facility] Outpatient Physical Therapy [place]  [address], [address]  [place] [address]  Phone:  [phone]  Fax:  [phone]    Date: 12/07/2022    Patient: Amber Roach  YOB: 1940  MRN: [id number]     Time Calculation    Start time: 0945  Stop time: 1030 Time Calculation (min): 45 minutes       Chief Complaint: back problem    Visit #: 8    SUBJECTIVE:  Pain has overall improved but notes that she still has a hard time getting in the car with her R hip. Reports compliance with HEP.     OBJECTIVE:  Current objective measures: lumbar flexion limited with forward bend to knees. Back extension to neutral with reported discomfort. Rotation 50% bilateral. Hip flexion MMT 2-/5 R, 3+/5 L, LBDQ 18%          Therapeutic Exercises (CPT 19737):     1. nustep, lvl 3 x 7min    2. Supine march, lvl 1 x 20    3. bolster bridge, 2 sec holds x 15    4. Seated pelvic tilt, on disk x 30    5. Supine ball rolls bracing, x 60    6. Supine lumbar mini twists, ball x 60    7. Seated marches, x 20    8. sit to stand, x11    9. Standing paloff press, blk band 2 sec holds, 2x 12 each side., NT    10. assisted SLR, strap 2x 10,    11. step up and down, 6in retro eccentric, NT    20. UPOC 12/15/22      Therapeutic Exercise Summary: HEP instruction/performance and development. Handout provided and exercises located below:  Access Code: [email or web address]  URL: [email or web address]  Date: 11/16/2022  Prepared by: Celestino Long    Exercises        Supine Gluteal Sets - 1 x daily - 10 reps - 5 hold      Sit to Stand - 2 x daily - 5-10 reps      Supine March - 2 x daily - 20 reps      Supine Lumbar Rotation - 1 x daily - 10-15 reps    Time-based treatments/modalities:    Physical Therapy Timed Treatment Charges  Therapeutic exercise minutes (CPT 17707): 45 minutes      Pain rating (1-10) before treatment:  1  Pain rating (1-10) after treatment:  1    ASSESSMENT:   Response to  treatment: HEP maintained. SLR strength/ tolerance to loading improving. Overall demonstrated no worsening pain with exercises or movement during today's session. F/u  next week.        PLAN/RECOMMENDATIONS:   Plan for treatment: therapy treatment to continue next visit.  Planned interventions for next visit: continue with current treatment.

## 2022-12-12 ENCOUNTER — PHYSICAL THERAPY (OUTPATIENT)
Dept: PHYSICAL THERAPY | Facility: REHABILITATION | Age: 82
End: 2022-12-12
Attending: PHYSICAL MEDICINE & REHABILITATION
Payer: COMMERCIAL

## 2022-12-12 DIAGNOSIS — M47.816 LUMBAR SPONDYLOSIS: ICD-10-CM

## 2022-12-12 DIAGNOSIS — M41.80 DEXTROSCOLIOSIS: ICD-10-CM

## 2022-12-12 DIAGNOSIS — M54.50 LUMBOSACRAL PAIN: ICD-10-CM

## 2022-12-12 DIAGNOSIS — M51.36 DDD (DEGENERATIVE DISC DISEASE), LUMBAR: ICD-10-CM

## 2022-12-12 PROCEDURE — 97110 THERAPEUTIC EXERCISES: CPT

## 2022-12-12 NOTE — OP THERAPY DAILY TREATMENT
Outpatient Physical Therapy  DAILY TREATMENT     [facility] Outpatient Physical Therapy [place]  [address], [address]  [place] [address]  Phone:  [phone]  Fax:  [phone]    Date: 12/12/2022    Patient: Amber Roach  YOB: 1940  MRN: [id number]     Time Calculation    Start time: 1115  Stop time: 1145 Time Calculation (min): 30 minutes       Chief Complaint: back problem    Visit #: 9    SUBJECTIVE:  Pain has overall improved. Hip flexion into the car or into bed still bothersome chronically s/p hip surgery. Reports compliance with HEP.     OBJECTIVE:  Current objective measures: lumbar flexion limited with forward bend to knees. Back extension to neutral with reported discomfort. Rotation 50% bilateral. Hip flexion MMT 2-/5 R, 3+/5 L, LBDQ 18%          Therapeutic Exercises (CPT 91185):     1. nustep, lvl 3 x 7min    2. Supine march, lvl 1 x 20    3. bolster bridge, 2 sec holds x 15    4. Seated pelvic tilt, on disk x 30    5. Supine ball rolls bracing, x 60    6. Supine lumbar mini twists, ball x 60    7. Seated marches, x 20    8. sit to stand, x11, NT    9. Standing paloff press, blk band 2 sec holds, 2x 12 each side., NT    10. assisted SLR, strap 2x 10,, HEP    11. step up and down, 6in retro eccentric, NT    20. UPOC 12/15/22      Therapeutic Exercise Summary: HEP instruction/performance and development. Handout provided and exercises located below:  Access Code: [email or web address]  URL: [email or web address]  Date: 11/16/2022  Prepared by: Celestino Long    Exercises        Supine Gluteal Sets - 1 x daily - 10 reps - 5 hold      Sit to Stand - 2 x daily - 5-10 reps      Supine March - 2 x daily - 20 reps      Supine Lumbar Rotation - 1 x daily - 10-15 reps    Time-based treatments/modalities:    Physical Therapy Timed Treatment Charges  Therapeutic exercise minutes (CPT 10444): 30 minutes      Pain rating (1-10) before treatment:  1  Pain rating (1-10) after treatment:  1    ASSESSMENT:    Response to treatment: HEP maintained. SLR strength/ tolerance to loading improving. Pt to trial independence with HEP for 2 weeks and follow up for likely discharge.       PLAN/RECOMMENDATIONS:   Plan for treatment: therapy treatment to continue next visit.  Planned interventions for next visit: continue with current treatment.

## 2022-12-13 ENCOUNTER — OFFICE VISIT (OUTPATIENT)
Dept: PHYSICAL MEDICINE AND REHAB | Facility: MEDICAL CENTER | Age: 82
End: 2022-12-13
Payer: COMMERCIAL

## 2022-12-13 VITALS
HEART RATE: 84 BPM | TEMPERATURE: 96.9 F | SYSTOLIC BLOOD PRESSURE: 128 MMHG | DIASTOLIC BLOOD PRESSURE: 68 MMHG | HEIGHT: 65 IN | BODY MASS INDEX: 26.82 KG/M2 | WEIGHT: 161 LBS | OXYGEN SATURATION: 95 %

## 2022-12-13 DIAGNOSIS — M41.80 DEXTROSCOLIOSIS: ICD-10-CM

## 2022-12-13 DIAGNOSIS — M51.36 DDD (DEGENERATIVE DISC DISEASE), LUMBAR: ICD-10-CM

## 2022-12-13 DIAGNOSIS — M47.816 LUMBAR SPONDYLOSIS: ICD-10-CM

## 2022-12-13 DIAGNOSIS — M43.16 SPONDYLOLISTHESIS OF LUMBAR REGION: ICD-10-CM

## 2022-12-13 PROCEDURE — 99213 OFFICE O/P EST LOW 20 MIN: CPT | Performed by: PHYSICAL MEDICINE & REHABILITATION

## 2022-12-13 ASSESSMENT — PATIENT HEALTH QUESTIONNAIRE - PHQ9: CLINICAL INTERPRETATION OF PHQ2 SCORE: 0

## 2022-12-13 ASSESSMENT — PAIN SCALES - GENERAL: PAINLEVEL: 4=SLIGHT-MODERATE PAIN

## 2022-12-13 NOTE — PROGRESS NOTES
Follow-up patient note    Physiatry (physical medicine and  Rehabilitation), interventional spine and sports medicine    Date of Service: 12/13/2022    Chief complaint:   Chief Complaint   Patient presents with    Follow-Up     Waist and hip pain       HISTORY    HPI: Amber Roach 82 y.o. 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  This is primarily axial.  This seems to come on with activity.  Sitting, usually, pain is not present.    She has been making progress after PT.  This has been somewhat helpful.    Pain is most difficult for her when she is vacuuming or clean house.  This aggravates her back.  Overally, her pain is about 30% better.    Not taking tylenol every day.   Pain does seem to respond to tylenol.      Medical records review:  I reviewed the note from the referring provider Dav Wheeler III, M.* dated 06/27/2022.      Previous treatments:    Physical Therapy: Yes, but not for her back    Medications the patient is tried: tylenol and tramadol    Previous interventions: none recently    Previous surgeries to relieve the above pain:  no back surgeries      ROS:   Red Flags ROS:   Fever, Chills, Sweats: Denies  Involuntary Weight Loss: Denies  Bladder Incontinence: Denies  Bowel Incontinence: Denies  Saddle Anesthesia: Denies    All other systems reviewed and negative.       PMHx:   Past Medical History:   Diagnosis Date    Abnormal blood chemistry 2/8/2012    Abnormal electrocardiogram 2/8/2012    Breast cancer (HCC) 2/8/2012    Hyperlipidemia 2/8/2012    Hypertension 2/8/2012    Macular degeneration of right eye 2017    Vitamin d deficiency 2/8/2012       PSHx:   Past Surgical History:   Procedure Laterality Date    ARTHROPLASTY Right 06/2020    right replacement    CHOLECYSTECTOMY  09/2013    LIVER BIOPSY  2013    PBC autoimmmune    LUMPECTOMY Right 01/1994    lumpectomy for cancer    ABDOMINAL HYSTERECTOMY  TOTAL  09/1993    APPENDECTOMY  1953    KNEE ARTHROSCOPY Bilateral 1995 and 1996    OTHER      appendectomy 1952 right knee replacement 1995 liver biopsy 2013 hysterectomy 1993 left knee replacement 1997 breast lumpectomy 1994 cholecystectomy 2013       Family history   Family History   Problem Relation Age of Onset    Heart Disease Brother     Heart Attack Brother          Medications:   Current Outpatient Medications   Medication    chlorhexidine (PERIDEX) 0.12 % Solution    triamcinolone acetonide (KENALOG) 0.1 % Cream    esomeprazole (NEXIUM) 40 MG delayed-release capsule    valsartan-hydrochlorothiazide (DIOVAN-HCT) 320-12.5 MG per tablet    carvedilol (COREG) 12.5 MG Tab    atorvastatin (LIPITOR) 20 MG Tab    ibandronate (BONIVA) 150 MG tablet    Omega-3 Fatty Acids (FISH OIL CONCENTRATE PO)    Calcium Carbonate-Vitamin D (CALCIUM 500 + D PO)    Multiple Vitamins-Minerals (PRESERVISION AREDS 2 PO)    Multiple Vitamin (MULTI-VITAMIN) TABS    Cholecalciferol (VITAMIN D) 1000 UNIT CAPS    benzonatate (TESSALON) 200 MG capsule     No current facility-administered medications for this visit.       Allergies:   Allergies   Allergen Reactions    Amlodipine      hives    Ampicillin     Penicillins        Social Hx:   Social History     Socioeconomic History    Marital status:      Spouse name: Not on file    Number of children: Not on file    Years of education: Not on file    Highest education level: Not on file   Occupational History    Not on file   Tobacco Use    Smoking status: Never    Smokeless tobacco: Never   Vaping Use    Vaping Use: Never used   Substance and Sexual Activity    Alcohol use: Yes     Alcohol/week: 0.6 oz     Types: 1 Glasses of wine per week     Comment: daily    Drug use: No    Sexual activity: Not Currently   Other Topics Concern    Not on file   Social History Narrative    Not on file     Social Determinants of Health     Financial Resource Strain: Not on file   Food Insecurity: Not  "on file   Transportation Needs: Not on file   Physical Activity: Not on file   Stress: Not on file   Social Connections: Not on file   Intimate Partner Violence: Not on file   Housing Stability: Not on file         EXAMINATION     Physical Exam:   Vitals: /68 (BP Location: Right arm, Patient Position: Sitting, BP Cuff Size: Adult long)   Pulse 84   Temp 36.1 °C (96.9 °F) (Temporal)   Ht 1.651 m (5' 5\")   Wt 73 kg (161 lb)   SpO2 95%     Constitutional:   Body Habitus: Body mass index is 26.79 kg/m².  Cooperation: Fully cooperates with exam  Appearance: Well-groomed, well-nourished, not disheveled, in no acute distress    Eyes: No scleral icterus, no proptosis     ENT -no obvious auditory deficits, wearing a face mask     Skin -no rashes or lesions noted     Respiratory-  breathing comfortable on room air, no audible wheezing    Cardiovascular-No lower extremity edema is noted.     Psychiatric- alert and oriented ×3. Normal affect.     Gait - normal gait, no use of ambulatory device, nonantalgic.     Musculoskeletal -     Thoracic/Lumbar Spine/Sacral Spine/Hips   Inspection: No evidence of atrophy in bilateral lower extremities throughout     Lumbar spine Special tests  Neuro tension  Seated straight leg test negative bilaterally      Mild tenderness over the lumbar paraspinals, right greater than left.  Mild muscle spasms.      Neuro       Key points for the international standards for neurological classification of spinal cord injury (ISNCSCI) to light touch.     Dermatome R L   L2 2 2   L3 2 2   L4 2 2   L5 2 2   S1 2 2   S2 2 2       Motor Exam Lower Extremities    ? Myotome R L   Hip flexion L2 5 5   Knee extension L3 5 5   Ankle dorsiflexion L4 5 5   Toe extension L5 5- 5   Ankle plantarflexion S1 5 5       Reflexes  ?  R L   Patella  absent absent   Achilles   2+ 2+       MEDICAL DECISION MAKING    Medical records review: see under HPI section.     DATA    Labs:   06/02/2022; 130 Na, BUN 12, Cr " 0.96, AST 36, GFR 55    Lab Results   Component Value Date/Time    SODIUM 131 (L) 08/27/2021 12:21 PM    POTASSIUM 4.5 08/27/2021 12:21 PM    CHLORIDE 95 (L) 08/27/2021 12:21 PM    CO2 23 08/27/2021 12:21 PM    GLUCOSE 94 08/27/2021 12:21 PM    BUN 15 08/27/2021 12:21 PM    CREATININE 0.88 08/27/2021 12:21 PM    CALCIUM 9.5 08/27/2021 12:21 PM    ALBUMIN 4.2 08/27/2021 12:21 PM       No results found for: PROTHROMBTM, INR     Lab Results   Component Value Date/Time    RBC 4.08 06/19/2020 02:00 PM    HEMOGLOBIN 9.6 (L) 06/25/2020 04:28 AM    HEMATOCRIT 28.4 (L) 06/25/2020 04:28 AM    MCV 98.1 06/19/2020 02:00 PM    MCH 32.9 06/19/2020 02:00 PM    MCHC 33.5 06/19/2020 02:00 PM    MPV 6.3 (L) 06/19/2020 02:00 PM    NEUTSPOLYS 65 06/19/2020 02:00 PM    LYMPHOCYTES 23 06/19/2020 02:00 PM    MONOCYTES 9 06/19/2020 02:00 PM    EOSINOPHILS 3 06/19/2020 02:00 PM    BASOPHILS 1 06/19/2020 02:00 PM        Lab Results   Component Value Date/Time    HBA1C 5.5 06/19/2020 02:00 PM        Imaging: I personally reviewed following images, these are my reads  X-ray lumbar spine August 26, 2022  There is grade 1 anterolisthesis of L5 on S1.  There is no significant instability noted.  Diffuse multilevel degenerative disc disease and multilevel lumbar spondylosis.    MRI lumbar spine 12/19/2020  Lumbar dextroscoliosis.  At L1-2, disc height loss, no central or foraminal stenosis  At L2-3, disc height loss, facet arthropathy, no central or foraminal stenosis  At L3-4, disc height loss, facet arthropathy, mild foraminal stenosis bilaterally, no central canal stenosis  At L4-5, disc height loss, facet arthropathy, mild central canal stenosis, mild right foraminal stenosis  At L5-S1, grade I anterolisthesis, bilateral face arthropathy, mild foraminal stenosis bilaterally, no central stenosis        IMAGING radiology reads. I reviewed the following radiology reads     X-ray lumbar spine August 26, 2022     IMPRESSION:        1.  Severe  multilevel lumbar spondylosis with no acute fracture noted.  2.  Grade 1 anterolisthesis of L5 on S1. No radiographic evidence of instability.                       MR-LUMBAR SPINE-W/O [facility] 12/19/2020                                                                                     Diagnosis   Visit Diagnoses     ICD-10-CM   1. Spondylolisthesis of lumbar region  M43.16   2. DDD (degenerative disc disease), lumbar  M51.36   3. Lumbar spondylosis  M47.816   4. Dextroscoliosis  M41.80               ASSESSMENT:  Amber Roach 82 y.o. female seen for above     Amber was seen today for follow-up.    Diagnoses and all orders for this visit:    Spondylolisthesis of lumbar region    DDD (degenerative disc disease), lumbar    Lumbar spondylosis    Dextroscoliosis      Continue with home program from physical therapy.  She has had some benefit from topical voltaren and oral tylenol.  Discussed getting a lighter weight vacuum.  Discussed possible trigger point injections if symptoms worsen, can consider different treatment targets depending on that.      Follow-up: Return if symptoms worsen or fail to improve.      Thank you very much for asking me to participate in Amber Roach's care.  Please contact me with any questions or concerns.    Please note that this dictation was created using voice recognition software. I have made every reasonable attempt to correct obvious errors but there may be errors of grammar and content that I may have overlooked prior to finalization of this note.      Gustavo Aguilar MD  Physical Medicine and Rehabilitation  Interventional Spine and Sports Physiatry  [facility]         Dav Mackay III, M.

## 2022-12-14 ENCOUNTER — APPOINTMENT (OUTPATIENT)
Dept: RADIOLOGY | Facility: MEDICAL CENTER | Age: 82
End: 2022-12-14
Attending: FAMILY MEDICINE
Payer: COMMERCIAL

## 2022-12-14 DIAGNOSIS — Z12.31 VISIT FOR SCREENING MAMMOGRAM: ICD-10-CM

## 2022-12-21 ENCOUNTER — HOSPITAL ENCOUNTER (OUTPATIENT)
Dept: RADIOLOGY | Facility: MEDICAL CENTER | Age: 82
End: 2022-12-21
Attending: FAMILY MEDICINE
Payer: COMMERCIAL

## 2022-12-21 DIAGNOSIS — Z12.31 VISIT FOR SCREENING MAMMOGRAM: ICD-10-CM

## 2022-12-21 PROCEDURE — 77063 BREAST TOMOSYNTHESIS BI: CPT

## 2022-12-29 ENCOUNTER — APPOINTMENT (OUTPATIENT)
Dept: PHYSICAL THERAPY | Facility: REHABILITATION | Age: 82
End: 2022-12-29
Attending: PHYSICAL MEDICINE & REHABILITATION
Payer: COMMERCIAL

## 2022-12-29 NOTE — OP THERAPY DAILY TREATMENT
Outpatient Physical Therapy  DAILY TREATMENT     [facility] Outpatient Physical Therapy [place]  [address], [address]  [place] [address]  Phone:  [phone]  Fax:  [phone]    Date: 12/29/2022    Patient: Amber Roach  YOB: 1940  MRN: [id number]     Time Calculation                 Chief Complaint: back problem    Visit #: 10    SUBJECTIVE:  Pain has overall improved. Hip flexion into the car or into bed still bothersome chronically s/p hip surgery. Reports compliance with HEP.     OBJECTIVE:  Current objective measures: lumbar flexion limited with forward bend to knees. Back extension to neutral with reported discomfort. Rotation 50% bilateral. Hip flexion MMT 2-/5 R, 3+/5 L, LBDQ 18%          Therapeutic Exercises (CPT 70879):     1. nustep, lvl 3 x 7min    2. Supine march, lvl 1 x 20    3. bolster bridge, 2 sec holds x 15    4. Seated pelvic tilt, on disk x 30    5. Supine ball rolls bracing, x 60    6. Supine lumbar mini twists, ball x 60    7. Seated marches, x 20    8. sit to stand, x11, NT    9. Standing paloff press, blk band 2 sec holds, 2x 12 each side., NT    10. assisted SLR, strap 2x 10,, HEP    11. step up and down, 6in retro eccentric, NT    20. UPOC 12/15/22      Therapeutic Exercise Summary: HEP instruction/performance and development. Handout provided and exercises located below:  Access Code: [email or web address]  URL: [email or web address]  Date: 11/16/2022  Prepared by: Celestino Long    Exercises        Supine Gluteal Sets - 1 x daily - 10 reps - 5 hold      Sit to Stand - 2 x daily - 5-10 reps      Supine March - 2 x daily - 20 reps      Supine Lumbar Rotation - 1 x daily - 10-15 reps    Time-based treatments/modalities:           Pain rating (1-10) before treatment:  1  Pain rating (1-10) after treatment:  1    ASSESSMENT:   Response to treatment: HEP maintained. SLR strength/ tolerance to loading improving. Pt to trial independence with HEP for 2 weeks and follow up for  likely discharge.       PLAN/RECOMMENDATIONS:   Plan for treatment: therapy treatment to continue next visit.  Planned interventions for next visit: continue with current treatment.

## 2022-12-30 ENCOUNTER — HOSPITAL ENCOUNTER (OUTPATIENT)
Facility: MEDICAL CENTER | Age: 82
End: 2022-12-30
Attending: NURSE PRACTITIONER
Payer: COMMERCIAL

## 2022-12-30 ENCOUNTER — OFFICE VISIT (OUTPATIENT)
Dept: URGENT CARE | Facility: PHYSICIAN GROUP | Age: 82
End: 2022-12-30
Payer: COMMERCIAL

## 2022-12-30 VITALS
BODY MASS INDEX: 25.33 KG/M2 | RESPIRATION RATE: 18 BRPM | SYSTOLIC BLOOD PRESSURE: 124 MMHG | DIASTOLIC BLOOD PRESSURE: 72 MMHG | WEIGHT: 152 LBS | HEIGHT: 65 IN | TEMPERATURE: 97 F | OXYGEN SATURATION: 96 % | HEART RATE: 71 BPM

## 2022-12-30 DIAGNOSIS — R68.83 CHILLS (WITHOUT FEVER): ICD-10-CM

## 2022-12-30 DIAGNOSIS — R31.21 ASYMPTOMATIC MICROSCOPIC HEMATURIA: ICD-10-CM

## 2022-12-30 DIAGNOSIS — N30.01 ACUTE CYSTITIS WITH HEMATURIA: ICD-10-CM

## 2022-12-30 LAB
APPEARANCE UR: CLEAR
BILIRUB UR STRIP-MCNC: ABNORMAL MG/DL
COLOR UR AUTO: YELLOW
GLUCOSE UR STRIP.AUTO-MCNC: ABNORMAL MG/DL
KETONES UR STRIP.AUTO-MCNC: ABNORMAL MG/DL
LEUKOCYTE ESTERASE UR QL STRIP.AUTO: ABNORMAL
NITRITE UR QL STRIP.AUTO: ABNORMAL
PH UR STRIP.AUTO: 5.5 [PH] (ref 5–8)
PROT UR QL STRIP: ABNORMAL MG/DL
RBC UR QL AUTO: ABNORMAL
SP GR UR STRIP.AUTO: 1.02
UROBILINOGEN UR STRIP-MCNC: 0.2 MG/DL

## 2022-12-30 PROCEDURE — 87086 URINE CULTURE/COLONY COUNT: CPT

## 2022-12-30 PROCEDURE — 81002 URINALYSIS NONAUTO W/O SCOPE: CPT | Performed by: NURSE PRACTITIONER

## 2022-12-30 PROCEDURE — 99213 OFFICE O/P EST LOW 20 MIN: CPT | Performed by: NURSE PRACTITIONER

## 2022-12-30 RX ORDER — CIPROFLOXACIN 500 MG/1
500 TABLET, FILM COATED ORAL 2 TIMES DAILY
Qty: 14 TABLET | Refills: 0 | Status: SHIPPED | OUTPATIENT
Start: 2022-12-30 | End: 2023-01-06

## 2022-12-30 ASSESSMENT — ENCOUNTER SYMPTOMS
RESPIRATORY NEGATIVE: 1
CARDIOVASCULAR NEGATIVE: 1
EYES NEGATIVE: 1
NEUROLOGICAL NEGATIVE: 1
CHILLS: 1
DIARRHEA: 1
FEVER: 0
MUSCULOSKELETAL NEGATIVE: 1

## 2022-12-30 NOTE — PROGRESS NOTES
"Subjective:   Amber Roach is a 82 y.o. female who presents for Chills (X 3 days, Not cold chills, feels like a body shiver)      Patient presents with a chief complaint of \"shaking chills/tremors.\"  She states this began approximately 2 to 3 days ago.  She states that she feels these chills are not like the type of chills you would get with an illness.  She reports no symptoms of illness, except some diarrhea.  She also states she is not hungry, but knows she needs to eat so she does have a couple of crackers with peanut butter on them.  She denies any sick contacts, except a family member during the Christmas holiday that may have had a sore throat.  Patient reports she has had an episode similar to this a couple of years ago, which resolved on its own.  She reports that she has a heated beanbag and she would get into the beanbag and get warm, and this would help.  Currently, she is getting into her beanbag at nighttime and does feel this helps.  She states that she does not have as much of this shaking during the daytime, though she is shaking in the office today.  She also reports she got some bad news recently, and does not know if this is anxiety.  She has not had any fevers.  She denies any abdominal pain, any pain with urination or frequency of urination.  She denies sore throat or ear pain.  She denies chest pain or shortness of breath.    Chills  This is a new problem. Episode onset: 3 days. The problem occurs intermittently. The problem has been unchanged (mostly at nighttime). Associated symptoms include chills. Pertinent negatives include no fever. Nothing aggravates the symptoms. Treatments tried: getting warm. The treatment provided mild relief.     Review of Systems   Constitutional:  Positive for chills. Negative for fever.   HENT: Negative.     Eyes: Negative.    Respiratory: Negative.     Cardiovascular: Negative.    Gastrointestinal:  Positive for diarrhea.   Genitourinary: Negative.  " "  Musculoskeletal: Negative.    Skin: Negative.    Neurological: Negative.      Medications, Allergies, and current problem list reviewed today in Epic.     Objective:     /72   Pulse 71   Temp 36.1 °C (97 °F) (Temporal)   Resp 18   Ht 1.651 m (5' 5\")   Wt 68.9 kg (152 lb)   SpO2 96%     Physical Exam  Vitals reviewed.   Constitutional:       Appearance: Normal appearance. She is well-developed and well-groomed.      Comments: Patient is noted to be shaking on exam today.   HENT:      Head: Normocephalic and atraumatic.      Right Ear: Tympanic membrane, ear canal and external ear normal.      Left Ear: Tympanic membrane, ear canal and external ear normal.      Nose: Nose normal.      Mouth/Throat:      Mouth: Mucous membranes are moist.      Pharynx: Oropharynx is clear.   Eyes:      Extraocular Movements: Extraocular movements intact.      Conjunctiva/sclera: Conjunctivae normal.      Pupils: Pupils are equal, round, and reactive to light.   Cardiovascular:      Rate and Rhythm: Normal rate and regular rhythm.   Pulmonary:      Effort: Pulmonary effort is normal.      Breath sounds: Normal breath sounds.   Abdominal:      General: Abdomen is flat.      Palpations: Abdomen is soft.   Musculoskeletal:         General: Normal range of motion.      Cervical back: Normal range of motion and neck supple.   Skin:     General: Skin is warm and dry.      Capillary Refill: Capillary refill takes less than 2 seconds.   Neurological:      General: No focal deficit present.      Mental Status: She is alert.   Psychiatric:         Mood and Affect: Mood normal.         Behavior: Behavior normal.     POCT UA positive for trace blood    Assessment/Plan:     Diagnosis and associated orders:     1. Acute cystitis with hematuria  ciprofloxacin (CIPRO) 500 MG Tab      2. Chills (without fever)  POCT Urinalysis    URINE CULTURE(NEW)      3. Asymptomatic microscopic hematuria  URINE CULTURE(NEW)         Comments/MDM: "     Suspect patient has a urinary tract infection as she has trace blood in her urine at this time.  Patient will be empirically treated for a UTI, and urine will be sent for culture.  Discussed with patient that blood in her urine could also indicate kidney stones or kidney disease, and she needs to monitor her symptoms at home, and if she develops any back pain, flank pain, fever, chills or worsening of symptoms, she will present to urgent care, ER, or her primary care physician for further evaluation and treatment.  Patient verbalizes understanding.         Differential diagnosis, natural history, supportive care, and indications for immediate follow-up discussed.    Advised the patient to follow-up with the primary care physician for recheck, reevaluation, and consideration of further management.    Please note that this dictation was created using voice recognition software. I have made a reasonable attempt to correct obvious errors, but I expect that there are errors of grammar and possibly content that I did not discover before finalizing the note.    This note was electronically signed by RICCO Zimmerman

## 2023-01-02 ENCOUNTER — OFFICE VISIT (OUTPATIENT)
Dept: URGENT CARE | Facility: PHYSICIAN GROUP | Age: 83
End: 2023-01-02
Payer: COMMERCIAL

## 2023-01-02 VITALS
RESPIRATION RATE: 18 BRPM | OXYGEN SATURATION: 97 % | TEMPERATURE: 97.5 F | BODY MASS INDEX: 25.33 KG/M2 | SYSTOLIC BLOOD PRESSURE: 178 MMHG | DIASTOLIC BLOOD PRESSURE: 96 MMHG | HEART RATE: 82 BPM | WEIGHT: 152 LBS | HEIGHT: 65 IN

## 2023-01-02 DIAGNOSIS — I10 ELEVATED BLOOD PRESSURE READING IN OFFICE WITH DIAGNOSIS OF HYPERTENSION: ICD-10-CM

## 2023-01-02 DIAGNOSIS — R06.02 SHORTNESS OF BREATH: ICD-10-CM

## 2023-01-02 DIAGNOSIS — I49.9 IRREGULAR CARDIAC RHYTHM: ICD-10-CM

## 2023-01-02 DIAGNOSIS — R94.31 ABNORMAL ECG: ICD-10-CM

## 2023-01-02 LAB
BACTERIA UR CULT: NORMAL
SIGNIFICANT IND 70042: NORMAL
SITE SITE: NORMAL
SOURCE SOURCE: NORMAL

## 2023-01-02 PROCEDURE — 99214 OFFICE O/P EST MOD 30 MIN: CPT | Performed by: NURSE PRACTITIONER

## 2023-01-02 PROCEDURE — 93000 ELECTROCARDIOGRAM COMPLETE: CPT | Performed by: NURSE PRACTITIONER

## 2023-01-03 ASSESSMENT — ENCOUNTER SYMPTOMS
CHILLS: 1
PALPITATIONS: 0
HEADACHES: 0
ORTHOPNEA: 0
NAUSEA: 0
SHORTNESS OF BREATH: 1
WEAKNESS: 1
VOMITING: 0

## 2023-01-03 NOTE — PROGRESS NOTES
"Subjective:     Amber Roach is a 82 y.o. female who presents for Anxiety (Panic attacks, pt wants a \"pill to relax her\")      Anxiety  Symptoms include shortness of breath. Patient reports no chest pain, nausea or palpitations.       Pt presents for evaluation of a new problem.  Amber is a very pleasant 82-year-old female who presents to urgent care today with ongoing symptoms of possible anxiety and uncontrollable shaking.  She states that she feels as though if she is not\" getting enough air\".  She denies any heart palpitations, chest pain, nausea, vomiting or diarrhea.  She notes that her symptoms are awakening her from sleep.  She was seen in urgent care on 12/30/2022 and was empirically treated for a UTI.  Urine culture did come back negative.  Her symptoms are progressively worsening.  She notes that her blood pressure has been very elevated and this is abnormal for her as she is taking twice daily blood pressure medication.     Review of Systems   Constitutional:  Positive for chills.   Respiratory:  Positive for shortness of breath.    Cardiovascular:  Negative for chest pain, palpitations and orthopnea.   Gastrointestinal:  Negative for nausea and vomiting.   Genitourinary:  Negative for dysuria, frequency and urgency.   Neurological:  Positive for weakness. Negative for headaches.     PMH:   Past Medical History:   Diagnosis Date    Abnormal blood chemistry 2/8/2012    Abnormal electrocardiogram 2/8/2012    Breast cancer (HCC) 2/8/2012    Hyperlipidemia 2/8/2012    Hypertension 2/8/2012    Macular degeneration of right eye 2017    Vitamin d deficiency 2/8/2012     ALLERGIES:   Allergies   Allergen Reactions    Amlodipine      hives    Ampicillin     Penicillins      SURGHX:   Past Surgical History:   Procedure Laterality Date    ARTHROPLASTY Right 06/2020    right replacement    CHOLECYSTECTOMY  09/2013    LIVER BIOPSY  2013    PBC autoimmmune    LUMPECTOMY Right 01/1994    lumpectomy for cancer    " "ABDOMINAL HYSTERECTOMY TOTAL  09/1993    APPENDECTOMY  1953    KNEE ARTHROSCOPY Bilateral 1995 and 1996    OTHER      appendectomy 1952 right knee replacement 1995 liver biopsy 2013 hysterectomy 1993 left knee replacement 1997 breast lumpectomy 1994 cholecystectomy 2013     SOCHX:   Social History     Socioeconomic History    Marital status:    Tobacco Use    Smoking status: Never    Smokeless tobacco: Never   Vaping Use    Vaping Use: Never used   Substance and Sexual Activity    Alcohol use: Yes     Alcohol/week: 0.6 oz     Types: 1 Glasses of wine per week     Comment: daily    Drug use: No    Sexual activity: Not Currently     FH:   Family History   Problem Relation Age of Onset    Heart Disease Brother     Heart Attack Brother          Objective:   BP (!) 178/96   Pulse 82   Temp 36.4 °C (97.5 °F)   Resp 18   Ht 1.651 m (5' 5\")   Wt 68.9 kg (152 lb)   SpO2 97%   BMI 25.29 kg/m²     Physical Exam  Vitals and nursing note reviewed.   Constitutional:       General: She is not in acute distress.     Appearance: Normal appearance. She is ill-appearing.   HENT:      Head: Normocephalic and atraumatic.      Right Ear: Tympanic membrane, ear canal and external ear normal.      Left Ear: Tympanic membrane, ear canal and external ear normal.      Nose: No congestion or rhinorrhea.      Mouth/Throat:      Mouth: Mucous membranes are moist.   Eyes:      Extraocular Movements: Extraocular movements intact.      Pupils: Pupils are equal, round, and reactive to light.   Cardiovascular:      Rate and Rhythm: Normal rate. Rhythm irregular.      Pulses: Normal pulses.      Heart sounds: Normal heart sounds.   Pulmonary:      Effort: Pulmonary effort is normal. No respiratory distress.      Breath sounds: Normal breath sounds. No stridor. No wheezing, rhonchi or rales.   Chest:      Chest wall: No tenderness.   Abdominal:      General: Abdomen is flat. Bowel sounds are normal.      Palpations: Abdomen is soft.      " Tenderness: There is no abdominal tenderness. There is no right CVA tenderness or left CVA tenderness.   Musculoskeletal:         General: Normal range of motion.      Cervical back: Normal range of motion and neck supple.   Skin:     General: Skin is warm and dry.      Capillary Refill: Capillary refill takes less than 2 seconds.   Neurological:      General: No focal deficit present.      Mental Status: She is alert and oriented to person, place, and time. Mental status is at baseline.   Psychiatric:         Mood and Affect: Mood normal.         Behavior: Behavior normal.         Thought Content: Thought content normal.         Judgment: Judgment normal.     ECG: Rate of 78, supraventricular bigeminy.  No ECG on file for comparison  Assessment/Plan:   Assessment    1. Irregular cardiac rhythm  EKG - Clinic Performed      2. Abnormal ECG        3. Shortness of breath        4. Elevated blood pressure reading in office with diagnosis of hypertension          On exam her heart rate was very irregular.  Due to her shortness of breath and irregular heart rhythm decision was made to perform an ECG.  Due to abnormal ECG and symptoms she was referred to follow-up immediately in emergency room.  She does have a designated  to take her to nearest ER.  She is in agreement with this plan.  Patient in route to ER.

## 2023-01-06 ENCOUNTER — OFFICE VISIT (OUTPATIENT)
Dept: MEDICAL GROUP | Facility: PHYSICIAN GROUP | Age: 83
End: 2023-01-06
Payer: COMMERCIAL

## 2023-01-06 VITALS
SYSTOLIC BLOOD PRESSURE: 146 MMHG | TEMPERATURE: 97.4 F | RESPIRATION RATE: 16 BRPM | BODY MASS INDEX: 25.49 KG/M2 | WEIGHT: 153 LBS | OXYGEN SATURATION: 96 % | DIASTOLIC BLOOD PRESSURE: 88 MMHG | HEART RATE: 64 BPM | HEIGHT: 65 IN

## 2023-01-06 DIAGNOSIS — F41.9 ANXIETY: ICD-10-CM

## 2023-01-06 DIAGNOSIS — I48.91 ATRIAL FIBRILLATION, UNSPECIFIED TYPE (HCC): ICD-10-CM

## 2023-01-06 PROCEDURE — 99214 OFFICE O/P EST MOD 30 MIN: CPT | Performed by: FAMILY MEDICINE

## 2023-01-06 RX ORDER — APIXABAN 5 MG/1
5 TABLET, FILM COATED ORAL 2 TIMES DAILY
COMMUNITY
Start: 2023-01-03 | End: 2023-01-06 | Stop reason: SDUPTHER

## 2023-01-06 RX ORDER — BUSPIRONE HYDROCHLORIDE 5 MG/1
5 TABLET ORAL 3 TIMES DAILY
Qty: 90 TABLET | Refills: 1 | Status: SHIPPED | OUTPATIENT
Start: 2023-01-06 | End: 2023-01-27 | Stop reason: SDUPTHER

## 2023-01-06 RX ORDER — APIXABAN 5 MG/1
5 TABLET, FILM COATED ORAL 2 TIMES DAILY
Qty: 180 TABLET | Refills: 3 | Status: SHIPPED | OUTPATIENT
Start: 2023-01-06 | End: 2023-01-27 | Stop reason: SDUPTHER

## 2023-01-06 ASSESSMENT — PATIENT HEALTH QUESTIONNAIRE - PHQ9: CLINICAL INTERPRETATION OF PHQ2 SCORE: 0

## 2023-01-06 NOTE — ASSESSMENT & PLAN NOTE
This is a chronic problem.  Patient has had troubles with anxiety for quite a long time.  She has been a little more stressed and anxious lately as her long-term neighbors are moving away.  They have helped her when she needed it for many years and now she is not sure what she is going to do.  She would like to have something for anxiety.

## 2023-01-06 NOTE — ASSESSMENT & PLAN NOTE
This is a new issue.  Patient was seen at urgent care in January 2 and found to have an irregular rhythm.  She ended up going to [facility] was found to be in atrial fibrillation.  We do not have those records as of yet.  She states that they did an echocardiogram and lab work on her.  They sent her out on Eliquis and she is here for follow-up.  She is still very stressed and nervous about it.  But otherwise not having symptoms.

## 2023-01-06 NOTE — PROGRESS NOTES
Subjective:     CC: Here for follow-up on recent hospitalization.    HPI:   Amber presents today with the following medical concerns:    Atrial fibrillation (HCC)  This is a new issue.  Patient was seen at urgent care in January 2 and found to have an irregular rhythm.  She ended up going to [facility] was found to be in atrial fibrillation.  We do not have those records as of yet.  She states that they did an echocardiogram and lab work on her.  They sent her out on Eliquis and she is here for follow-up.  She is still very stressed and nervous about it.  But otherwise not having symptoms.    Anxiety  This is a chronic problem.  Patient has had troubles with anxiety for quite a long time.  She has been a little more stressed and anxious lately as her long-term neighbors are moving away.  They have helped her when she needed it for many years and now she is not sure what she is going to do.  She would like to have something for anxiety.    Past Medical History:   Diagnosis Date    Abnormal blood chemistry 2/8/2012    Abnormal electrocardiogram 2/8/2012    Breast cancer (HCC) 2/8/2012    Hyperlipidemia 2/8/2012    Hypertension 2/8/2012    Macular degeneration of right eye 2017    Vitamin d deficiency 2/8/2012       Social History     Tobacco Use    Smoking status: Never    Smokeless tobacco: Never   Vaping Use    Vaping Use: Never used   Substance Use Topics    Alcohol use: Yes     Alcohol/week: 0.6 oz     Types: 1 Glasses of wine per week     Comment: daily    Drug use: No       Current Outpatient Medications Ordered in Epic   Medication Sig Dispense Refill    busPIRone (BUSPAR) 5 MG tablet Take 1 Tablet by mouth 3 times a day. As needed for anxiety 90 Tablet 1    ELIQUIS 5 MG Tab Take 1 Tablet by mouth 2 times a day. 180 Tablet 3    ciprofloxacin (CIPRO) 500 MG Tab Take 1 Tablet by mouth 2 times a day for 7 days. 14 Tablet 0    chlorhexidine (PERIDEX) 0.12 % Solution SWISH 15CC BY MOUTH TWICE A DAY. SPIT DO NOT  "SWALLOW      triamcinolone acetonide (KENALOG) 0.1 % Cream APPLY TO AFFECTED AREAS ON BACK TWICE A DAY X 14 DAYS/MONTH AS NEEDED .      esomeprazole (NEXIUM) 40 MG delayed-release capsule Take 1 Capsule by mouth every day. 90 Capsule 1    valsartan-hydrochlorothiazide (DIOVAN-HCT) 320-12.5 MG per tablet Take 1 Tablet by mouth every day. 100 Tablet 1    carvedilol (COREG) 12.5 MG Tab Take 3 in morning and 2 in evening 450 Tablet 1    atorvastatin (LIPITOR) 20 MG Tab Take 1 Tablet by mouth every day. 100 Tablet 1    ibandronate (BONIVA) 150 MG tablet Take 1 Tablet by mouth every 30 days. 3 Tablet 3    Omega-3 Fatty Acids (FISH OIL CONCENTRATE PO) Take  by mouth.      Calcium Carbonate-Vitamin D (CALCIUM 500 + D PO) Take  by mouth.      Multiple Vitamins-Minerals (PRESERVISION AREDS 2 PO) Take  by mouth.      Multiple Vitamin (MULTI-VITAMIN) TABS Take  by mouth every day.      Cholecalciferol (VITAMIN D) 1000 UNIT CAPS Take  by mouth every day.       No current [facility]-ordered facility-administered medications on file.       Allergies:  Amlodipine, Ampicillin, and Penicillins    Health Maintenance: Completed    ROS:  Gen: no fevers/chills, no changes in weight  Eyes: no changes in vision  ENT: no sore throat, no hearing loss, no bloody nose  Pulm: no sob, no cough  CV: no chest pain, no palpitations  GI: no nausea/vomiting, no diarrhea  : no dysuria  MSk: no myalgias  Skin: no rash  Neuro: no headaches, no numbness/tingling  Heme/Lymph: no easy bruising      Objective:       Exam:  BP (!) 146/88 (BP Location: Right arm, Patient Position: Sitting, BP Cuff Size: Adult)   Pulse 64   Temp 36.3 °C (97.4 °F) (Temporal)   Resp 16   Ht 1.651 m (5' 5\")   Wt 69.4 kg (153 lb)   SpO2 96%   BMI 25.46 kg/m²  Body mass index is 25.46 kg/m².    Gen: Alert and oriented, No apparent distress.  Neck: Neck is supple without lymphadenopathy.  Lungs: Normal effort, CTA bilaterally, no wheezes, rhonchi, or rales  CV: Irregular " rate/rhythm. No murmurs, rubs, or gallops.  Ext: No clubbing, cyanosis, edema.  Psych: Patient is alert and oriented x3.  No unusual thought Sami expressed.  Insight and judgment is good.  She appears mildly anxious on today's visit.      Assessment & Plan:     82 y.o. female with the following -     1. Atrial fibrillation, unspecified type (HCC)  This is a new problem.  We will refer her to cardiology.  She is to continue on her Eliquis for now.  Her rate is controlled.  We will also request the records from [facility].  - REFERRAL TO CARDIOLOGY    2. Anxiety  This is a chronic problem.  We will put her on buspirone to take as needed for anxiety.  She is to let me know how that works for her.      Return in about 6 months (around 7/6/2023).    Please note that this dictation was created using voice recognition software. I have made every reasonable attempt to correct obvious errors, but I expect that there are errors of grammar and possibly content that I did not discover before finalizing the note.

## 2023-01-10 RX ORDER — ESOMEPRAZOLE MAGNESIUM 40 MG/1
40 CAPSULE, DELAYED RELEASE ORAL DAILY
Qty: 90 CAPSULE | Refills: 3 | Status: SHIPPED | OUTPATIENT
Start: 2023-01-10 | End: 2023-01-27 | Stop reason: SDUPTHER

## 2023-01-10 RX ORDER — ATORVASTATIN CALCIUM 20 MG/1
20 TABLET, FILM COATED ORAL DAILY
Qty: 100 TABLET | Refills: 3 | Status: SHIPPED | OUTPATIENT
Start: 2023-01-10 | End: 2023-01-27 | Stop reason: SDUPTHER

## 2023-01-10 RX ORDER — VALSARTAN AND HYDROCHLOROTHIAZIDE 320; 12.5 MG/1; MG/1
1 TABLET, FILM COATED ORAL DAILY
Qty: 100 TABLET | Refills: 3 | Status: SHIPPED | OUTPATIENT
Start: 2023-01-10 | End: 2023-01-15

## 2023-01-11 ENCOUNTER — OFFICE VISIT (OUTPATIENT)
Dept: CARDIOLOGY | Facility: MEDICAL CENTER | Age: 83
End: 2023-01-11
Payer: COMMERCIAL

## 2023-01-11 VITALS
HEART RATE: 68 BPM | RESPIRATION RATE: 16 BRPM | BODY MASS INDEX: 26.16 KG/M2 | SYSTOLIC BLOOD PRESSURE: 124 MMHG | DIASTOLIC BLOOD PRESSURE: 78 MMHG | HEIGHT: 65 IN | OXYGEN SATURATION: 96 % | WEIGHT: 157 LBS

## 2023-01-11 DIAGNOSIS — I48.91 ATRIAL FIBRILLATION, UNSPECIFIED TYPE (HCC): ICD-10-CM

## 2023-01-11 DIAGNOSIS — E78.5 DYSLIPIDEMIA: ICD-10-CM

## 2023-01-11 DIAGNOSIS — I10 ESSENTIAL HYPERTENSION, BENIGN: ICD-10-CM

## 2023-01-11 LAB — EKG IMPRESSION: NORMAL

## 2023-01-11 PROCEDURE — 93000 ELECTROCARDIOGRAM COMPLETE: CPT | Performed by: INTERNAL MEDICINE

## 2023-01-11 PROCEDURE — 99204 OFFICE O/P NEW MOD 45 MIN: CPT | Performed by: INTERNAL MEDICINE

## 2023-01-11 ASSESSMENT — ENCOUNTER SYMPTOMS
DIZZINESS: 0
ORTHOPNEA: 0
HEARTBURN: 0
DIARRHEA: 0
WEIGHT LOSS: 0
WEIGHT GAIN: 0
CONSTIPATION: 0
PND: 0
ALTERED MENTAL STATUS: 0
FEVER: 0
NAUSEA: 0
NEAR-SYNCOPE: 0
BLURRED VISION: 0
PALPITATIONS: 0
BACK PAIN: 0
SYNCOPE: 0
ABDOMINAL PAIN: 0
VOMITING: 0
FLANK PAIN: 0
COUGH: 0
CLAUDICATION: 0
DYSPNEA ON EXERTION: 0
DECREASED APPETITE: 0
SHORTNESS OF BREATH: 0
DEPRESSION: 0
IRREGULAR HEARTBEAT: 0

## 2023-01-11 NOTE — PROGRESS NOTES
"Cardiology Note    Chief Complaint   Patient presents with    Abnormal EKG    Dyslipidemia    Hypertension       History of Present Illness: Amber Roach is a 82 y.o. female PMH rheumatic fever age 5, HTN, HLD who presents for initial visit. Referred by Dr Wheeler for atrial fibrillation    Per record, new onset AF found at [facility].     Patient recalls got very stressed. Noted her blood pressure ws elevated. Went to urgent care on Saturday. Referred to [facility] for AF. Patient recalls underwent multiple tests. Was started on eliquis and carvedilol. Following discharge saw her PCP. She describes \"nerve pill\" helped. This morning felt chills and \"nervous.\" Denies toxic social habits. No relevant family history.     Review of Systems   Constitutional: Negative for decreased appetite, fever, malaise/fatigue, weight gain and weight loss.   HENT:  Negative for congestion and nosebleeds.    Eyes:  Negative for blurred vision.   Cardiovascular:  Negative for chest pain, claudication, dyspnea on exertion, irregular heartbeat, leg swelling, near-syncope, orthopnea, palpitations, paroxysmal nocturnal dyspnea and syncope.   Respiratory:  Negative for cough and shortness of breath.    Endocrine: Negative for cold intolerance and heat intolerance.   Skin:  Negative for rash.   Musculoskeletal:  Negative for back pain.   Gastrointestinal:  Negative for abdominal pain, constipation, diarrhea, heartburn, melena, nausea and vomiting.   Genitourinary:  Negative for dysuria, flank pain and hematuria.   Neurological:  Negative for dizziness.   Psychiatric/Behavioral:  Negative for altered mental status and depression.        Past Medical History:   Diagnosis Date    Abnormal blood chemistry 2/8/2012    Abnormal electrocardiogram 2/8/2012    Breast cancer (HCC) 2/8/2012    Hyperlipidemia 2/8/2012    Hypertension 2/8/2012    Macular degeneration of right eye 2017    Vitamin d deficiency 2/8/2012         Past Surgical History:   Procedure " Laterality Date    ARTHROPLASTY Right 06/2020    right replacement    CHOLECYSTECTOMY  09/2013    LIVER BIOPSY  2013    PBC autoimmmune    LUMPECTOMY Right 01/1994    lumpectomy for cancer    ABDOMINAL HYSTERECTOMY TOTAL  09/1993    APPENDECTOMY  1953    KNEE ARTHROSCOPY Bilateral 1995 and 1996    OTHER      appendectomy 1952 right knee replacement 1995 liver biopsy 2013 hysterectomy 1993 left knee replacement 1997 breast lumpectomy 1994 cholecystectomy 2013         Current Outpatient Medications   Medication Sig Dispense Refill    atorvastatin (LIPITOR) 20 MG Tab Take 1 Tablet by mouth every day. 100 Tablet 3    esomeprazole (NEXIUM) 40 MG delayed-release capsule Take 1 Capsule by mouth every day. 90 Capsule 3    valsartan-hydrochlorothiazide (DIOVAN-HCT) 320-12.5 MG per tablet Take 1 Tablet by mouth every day. 100 Tablet 3    busPIRone (BUSPAR) 5 MG tablet Take 1 Tablet by mouth 3 times a day. As needed for anxiety 90 Tablet 1    ELIQUIS 5 MG Tab Take 1 Tablet by mouth 2 times a day. 180 Tablet 3    chlorhexidine (PERIDEX) 0.12 % Solution SWISH 15CC BY MOUTH TWICE A DAY. SPIT DO NOT SWALLOW      triamcinolone acetonide (KENALOG) 0.1 % Cream APPLY TO AFFECTED AREAS ON BACK TWICE A DAY X 14 DAYS/MONTH AS NEEDED .      carvedilol (COREG) 12.5 MG Tab Take 3 in morning and 2 in evening 450 Tablet 1    ibandronate (BONIVA) 150 MG tablet Take 1 Tablet by mouth every 30 days. 3 Tablet 3    Calcium Carbonate-Vitamin D (CALCIUM 500 + D PO) Take  by mouth.      Multiple Vitamins-Minerals (PRESERVISION AREDS 2 PO) Take  by mouth.      Multiple Vitamin (MULTI-VITAMIN) TABS Take  by mouth every day.      Cholecalciferol (VITAMIN D) 1000 UNIT CAPS Take  by mouth every day.       No current facility-administered medications for this visit.         Allergies   Allergen Reactions    Amlodipine      hives    Ampicillin     Penicillins          Family History   Problem Relation Age of Onset    Heart Disease Brother     Heart Attack  "Brother          Social History     Socioeconomic History    Marital status:      Spouse name: Not on file    Number of children: Not on file    Years of education: Not on file    Highest education level: Not on file   Occupational History    Not on file   Tobacco Use    Smoking status: Never    Smokeless tobacco: Never   Vaping Use    Vaping Use: Never used   Substance and Sexual Activity    Alcohol use: Yes     Alcohol/week: 0.6 oz     Types: 1 Glasses of wine per week     Comment: daily    Drug use: No    Sexual activity: Not Currently   Other Topics Concern    Not on file   Social History Narrative    Not on file     Social Determinants of Health     Financial Resource Strain: Not on file   Food Insecurity: Not on file   Transportation Needs: Not on file   Physical Activity: Not on file   Stress: Not on file   Social Connections: Not on file   Intimate Partner Violence: Not on file   Housing Stability: Not on file         Physical Exam:  Ambulatory Vitals  /78 (BP Location: Left arm, Patient Position: Sitting, BP Cuff Size: Adult)   Pulse 68   Resp 16   Ht 1.651 m (5' 5\")   Wt 71.2 kg (157 lb)   SpO2 96%    BP Readings from Last 4 Encounters:   01/11/23 124/78   01/06/23 (!) 146/88   01/02/23 (!) 178/96   12/30/22 124/72     Weight/BMI:   Vitals:    01/11/23 1239   BP: 124/78   Weight: 71.2 kg (157 lb)   Height: 1.651 m (5' 5\")    Body mass index is 26.13 kg/m².  Wt Readings from Last 4 Encounters:   01/11/23 71.2 kg (157 lb)   01/06/23 69.4 kg (153 lb)   01/02/23 68.9 kg (152 lb)   12/30/22 68.9 kg (152 lb)       Physical Exam  Constitutional:       General: She is not in acute distress.  HENT:      Head: Normocephalic and atraumatic.   Eyes:      Conjunctiva/sclera: Conjunctivae normal.      Pupils: Pupils are equal, round, and reactive to light.   Neck:      Vascular: No JVD.   Cardiovascular:      Rate and Rhythm: Normal rate and regular rhythm.      Heart sounds: Normal heart sounds. No " murmur heard.    No friction rub. No gallop.   Pulmonary:      Effort: Pulmonary effort is normal. No respiratory distress.      Breath sounds: Normal breath sounds. No wheezing or rales.   Chest:      Chest wall: No tenderness.   Abdominal:      General: Bowel sounds are normal. There is no distension.      Palpations: Abdomen is soft.   Musculoskeletal:      Cervical back: Normal range of motion and neck supple.   Skin:     General: Skin is warm and dry.   Neurological:      Mental Status: She is alert and oriented to person, place, and time.   Psychiatric:         Mood and Affect: Affect normal.         Judgment: Judgment normal.       Lab Data Review:  No results found for: CHOLSTRLTOT, LDL, HDL, TRIGLYCERIDE    Lab Results   Component Value Date/Time    SODIUM 131 (L) 08/27/2021 12:21 PM    POTASSIUM 4.5 08/27/2021 12:21 PM    CHLORIDE 95 (L) 08/27/2021 12:21 PM    CO2 23 08/27/2021 12:21 PM    GLUCOSE 94 08/27/2021 12:21 PM    BUN 15 08/27/2021 12:21 PM    CREATININE 0.88 08/27/2021 12:21 PM     CrCl cannot be calculated (Patient's most recent lab result is older than the maximum 7 days allowed.).  Lab Results   Component Value Date/Time    ALKPHOSPHAT 103 06/19/2020 02:00 PM    ASTSGOT 24 06/19/2020 02:00 PM    ALTSGPT 26 06/19/2020 02:00 PM    TBILIRUBIN 0.6 06/19/2020 02:00 PM      No results found for: WBC, HCT  Lab Results   Component Value Date/Time    HBA1C 5.5 06/19/2020 02:00 PM     No components found for: TROP      Cardiac Imaging and Procedures Review:      EKG 1/11/23 sinus, PACs, first dev avb, left axis    Medical Decision Making:  Problem List Items Addressed This Visit       Dyslipidemia    Relevant Orders    TSH WITH REFLEX TO FT4    Lipid Profile    Comp Metabolic Panel    CBC WITHOUT DIFFERENTIAL    Essential hypertension, benign    Relevant Orders    Comp Metabolic Panel    CBC WITHOUT DIFFERENTIAL    Atrial fibrillation (HCC)    Relevant Orders    EKG (Completed)    TSH WITH REFLEX TO FT4     Lipid Profile    Comp Metabolic Panel    CBC WITHOUT DIFFERENTIAL     Paroxysmal AF - chadsvasc 4. Doac for cva prevention. Continue rate control with carvedilol. Obtain records from [facility].    HTN - goal 120/80. Controlled. Continue regimen.    HLD - continue statin. Annual lipids.    Former patient of Dr Mistry.    It was my pleasure to meet with Ms. Roach.

## 2023-01-13 ENCOUNTER — HOSPITAL ENCOUNTER (OUTPATIENT)
Dept: LAB | Facility: MEDICAL CENTER | Age: 83
End: 2023-01-13
Attending: INTERNAL MEDICINE
Payer: COMMERCIAL

## 2023-01-13 DIAGNOSIS — E78.5 DYSLIPIDEMIA: ICD-10-CM

## 2023-01-13 DIAGNOSIS — I48.91 ATRIAL FIBRILLATION, UNSPECIFIED TYPE (HCC): ICD-10-CM

## 2023-01-13 DIAGNOSIS — I10 ESSENTIAL HYPERTENSION, BENIGN: ICD-10-CM

## 2023-01-13 LAB
ALBUMIN SERPL BCP-MCNC: 4.3 G/DL (ref 3.2–4.9)
ALBUMIN/GLOB SERPL: 1.3 G/DL
ALP SERPL-CCNC: 115 U/L (ref 30–99)
ALT SERPL-CCNC: 19 U/L (ref 2–50)
ANION GAP SERPL CALC-SCNC: 12 MMOL/L (ref 7–16)
AST SERPL-CCNC: 23 U/L (ref 12–45)
BILIRUB SERPL-MCNC: 0.7 MG/DL (ref 0.1–1.5)
BUN SERPL-MCNC: 15 MG/DL (ref 8–22)
CALCIUM ALBUM COR SERPL-MCNC: 9.2 MG/DL (ref 8.5–10.5)
CALCIUM SERPL-MCNC: 9.4 MG/DL (ref 8.5–10.5)
CHLORIDE SERPL-SCNC: 90 MMOL/L (ref 96–112)
CHOLEST SERPL-MCNC: 161 MG/DL (ref 100–199)
CO2 SERPL-SCNC: 25 MMOL/L (ref 20–33)
CREAT SERPL-MCNC: 0.74 MG/DL (ref 0.5–1.4)
ERYTHROCYTE [DISTWIDTH] IN BLOOD BY AUTOMATED COUNT: 40.2 FL (ref 35.9–50)
FASTING STATUS PATIENT QL REPORTED: NORMAL
GFR SERPLBLD CREATININE-BSD FMLA CKD-EPI: 81 ML/MIN/1.73 M 2
GLOBULIN SER CALC-MCNC: 3.2 G/DL (ref 1.9–3.5)
GLUCOSE SERPL-MCNC: 103 MG/DL (ref 65–99)
HCT VFR BLD AUTO: 37.2 % (ref 37–47)
HDLC SERPL-MCNC: 79 MG/DL
HGB BLD-MCNC: 12.8 G/DL (ref 12–16)
LDLC SERPL CALC-MCNC: 70 MG/DL
MCH RBC QN AUTO: 32 PG (ref 27–33)
MCHC RBC AUTO-ENTMCNC: 34.4 G/DL (ref 33.6–35)
MCV RBC AUTO: 93 FL (ref 81.4–97.8)
PLATELET # BLD AUTO: 264 K/UL (ref 164–446)
PMV BLD AUTO: 8.8 FL (ref 9–12.9)
POTASSIUM SERPL-SCNC: 4 MMOL/L (ref 3.6–5.5)
PROT SERPL-MCNC: 7.5 G/DL (ref 6–8.2)
RBC # BLD AUTO: 4 M/UL (ref 4.2–5.4)
SODIUM SERPL-SCNC: 127 MMOL/L (ref 135–145)
TRIGL SERPL-MCNC: 62 MG/DL (ref 0–149)
TSH SERPL DL<=0.005 MIU/L-ACNC: 1.19 UIU/ML (ref 0.38–5.33)
WBC # BLD AUTO: 6.8 K/UL (ref 4.8–10.8)

## 2023-01-13 PROCEDURE — 84443 ASSAY THYROID STIM HORMONE: CPT

## 2023-01-13 PROCEDURE — 80053 COMPREHEN METABOLIC PANEL: CPT

## 2023-01-13 PROCEDURE — 80061 LIPID PANEL: CPT

## 2023-01-13 PROCEDURE — 36415 COLL VENOUS BLD VENIPUNCTURE: CPT

## 2023-01-13 PROCEDURE — 85027 COMPLETE CBC AUTOMATED: CPT

## 2023-01-15 RX ORDER — CARVEDILOL 12.5 MG/1
TABLET ORAL
Qty: 450 TABLET | Refills: 1 | Status: SHIPPED | OUTPATIENT
Start: 2023-01-15 | End: 2023-01-27 | Stop reason: SDUPTHER

## 2023-01-15 RX ORDER — VALSARTAN 320 MG/1
320 TABLET ORAL DAILY
Qty: 90 TABLET | Refills: 3 | Status: SHIPPED | OUTPATIENT
Start: 2023-01-15 | End: 2023-01-27 | Stop reason: SDUPTHER

## 2023-01-27 ENCOUNTER — OFFICE VISIT (OUTPATIENT)
Dept: MEDICAL GROUP | Facility: PHYSICIAN GROUP | Age: 83
End: 2023-01-27
Payer: COMMERCIAL

## 2023-01-27 VITALS
RESPIRATION RATE: 18 BRPM | BODY MASS INDEX: 25.99 KG/M2 | WEIGHT: 156 LBS | HEIGHT: 65 IN | SYSTOLIC BLOOD PRESSURE: 128 MMHG | DIASTOLIC BLOOD PRESSURE: 74 MMHG | HEART RATE: 72 BPM | TEMPERATURE: 97.7 F | OXYGEN SATURATION: 96 %

## 2023-01-27 DIAGNOSIS — I48.0 PAROXYSMAL ATRIAL FIBRILLATION (HCC): ICD-10-CM

## 2023-01-27 DIAGNOSIS — E87.1 HYPONATREMIA: ICD-10-CM

## 2023-01-27 DIAGNOSIS — I10 ESSENTIAL HYPERTENSION, BENIGN: ICD-10-CM

## 2023-01-27 DIAGNOSIS — R74.8 ELEVATED ALKALINE PHOSPHATASE LEVEL: ICD-10-CM

## 2023-01-27 PROCEDURE — 99214 OFFICE O/P EST MOD 30 MIN: CPT | Performed by: FAMILY MEDICINE

## 2023-01-27 RX ORDER — BUSPIRONE HYDROCHLORIDE 5 MG/1
5 TABLET ORAL 3 TIMES DAILY
Qty: 90 TABLET | Refills: 1 | Status: SHIPPED | OUTPATIENT
Start: 2023-01-27 | End: 2023-01-27

## 2023-01-27 RX ORDER — BUSPIRONE HYDROCHLORIDE 5 MG/1
5 TABLET ORAL 3 TIMES DAILY
Qty: 270 TABLET | Refills: 3 | Status: SHIPPED | OUTPATIENT
Start: 2023-01-27 | End: 2023-12-18 | Stop reason: SDUPTHER

## 2023-01-27 RX ORDER — VALSARTAN 320 MG/1
320 TABLET ORAL DAILY
Qty: 90 TABLET | Refills: 3 | Status: SHIPPED | OUTPATIENT
Start: 2023-01-27 | End: 2024-01-02 | Stop reason: SDUPTHER

## 2023-01-27 RX ORDER — CARVEDILOL 25 MG/1
TABLET ORAL
Qty: 180 TABLET | Refills: 3 | Status: SHIPPED | OUTPATIENT
Start: 2023-01-27 | End: 2023-12-18 | Stop reason: SDUPTHER

## 2023-01-27 RX ORDER — ATORVASTATIN CALCIUM 20 MG/1
20 TABLET, FILM COATED ORAL DAILY
Qty: 100 TABLET | Refills: 3 | Status: SHIPPED | OUTPATIENT
Start: 2023-01-27 | End: 2023-12-18 | Stop reason: SDUPTHER

## 2023-01-27 RX ORDER — APIXABAN 5 MG/1
5 TABLET, FILM COATED ORAL 2 TIMES DAILY
Qty: 180 TABLET | Refills: 3 | Status: SHIPPED | OUTPATIENT
Start: 2023-01-27 | End: 2023-12-18 | Stop reason: SDUPTHER

## 2023-01-27 RX ORDER — ESOMEPRAZOLE MAGNESIUM 40 MG/1
40 CAPSULE, DELAYED RELEASE ORAL DAILY
Qty: 90 CAPSULE | Refills: 3 | Status: SHIPPED | OUTPATIENT
Start: 2023-01-27 | End: 2023-12-26 | Stop reason: SDUPTHER

## 2023-01-27 ASSESSMENT — FIBROSIS 4 INDEX: FIB4 SCORE: 1.64

## 2023-01-27 NOTE — LETTER
[facility]  Dav Wheeler III, M.D.  [address]  [facility] [address]  Fax: [phone]   Authorization for Release/Disclosure of   Protected Health Information   Name: MARIELA MADISON : 1940 SSN: [id number]   Address: [address] [place] [address] Phone:    [phone] (home)    I authorize the entity listed below to release/disclose the PHI below to:   [facility]/Dav Wheeler III, M.D. and Dav Wheeler III, M.D.   Provider or Entity Name:  [facility]    Address   [facility]  [address] [place] [address] Phone:  [phone]    Fax:  [phone]   Reason for request: continuity of care   Information to be released:    [  ] LAST COLONOSCOPY,  including any PATH REPORT and follow-up  [  ] LAST FIT/COLOGUARD RESULT [XXX] LAST DEXA  [  ] LAST MAMMOGRAM  [  ] LAST PAP  [  ] LAST LABS [  ] RETINA EXAM REPORT  [  ] IMMUNIZATION RECORDS  [  ] Release all info      [  ] Check here and initial the line next to each item to release ALL health information INCLUDING  _____ Care and treatment for drug and / or alcohol abuse  _____ HIV testing, infection status, or AIDS  _____ Genetic Testing    DATES OF SERVICE OR TIME PERIOD TO BE DISCLOSED: _____________  I understand and acknowledge that:  * This Authorization may be revoked at any time by you in writing, except if your health information has already been used or disclosed.  * Your health information that will be used or disclosed as a result of you signing this authorization could be re-disclosed by the recipient. If this occurs, your re-disclosed health information may no longer be protected by State or Federal laws.  * You may refuse to sign this Authorization. Your refusal will not affect your ability to obtain treatment.  * This Authorization becomes effective upon signing and will  on (date) __________.      If no date is indicated, this Authorization will  one (1) year from the  signature date.    Name: Amber Roach    Signature: Continuity of care    Date:     1/27/2023       PLEASE FAX REQUESTED RECORDS BACK TO: [phone]

## 2023-01-27 NOTE — ASSESSMENT & PLAN NOTE
This is a chronic problem.  She is on chronic anticoagulant treatment.  She is followed by cardiology.

## 2023-01-27 NOTE — ASSESSMENT & PLAN NOTE
This is a chronic problem.  Blood pressures well controlled on current medications.  She does see cardiology on a regular basis.

## 2023-01-27 NOTE — PROGRESS NOTES
Subjective:     CC: Here to discuss several health concerns and also wants to get her medications done through her mail order.    HPI:   Amber presents today with the following medical issues:    Hyponatremia  This is a chronic problem.  On her last testing her sodium was lower at 127 so her cardiologist stopped her hydrochlorothiazide.  She is due to have labs again next week and we will see if it improves.    Essential hypertension, benign  This is a chronic problem.  Blood pressures well controlled on current medications.  She does see cardiology on a regular basis.    Elevated alkaline phosphatase level  This is a chronic problem.  Her alkaline phosphatase was a little elevated on her last testing.  We will continue to follow it and if it elevates further we will do further investigation.    Atrial fibrillation (HCC)  This is a chronic problem.  She is on chronic anticoagulant treatment.  She is followed by cardiology.    Past Medical History:   Diagnosis Date    Abnormal blood chemistry 2/8/2012    Abnormal electrocardiogram 2/8/2012    Breast cancer (HCC) 2/8/2012    Hyperlipidemia 2/8/2012    Hypertension 2/8/2012    Macular degeneration of right eye 2017    Vitamin d deficiency 2/8/2012       Social History     Tobacco Use    Smoking status: Never    Smokeless tobacco: Never   Vaping Use    Vaping Use: Never used   Substance Use Topics    Alcohol use: Yes     Alcohol/week: 0.6 oz     Types: 1 Glasses of wine per week     Comment: daily    Drug use: No       Current Outpatient Medications Ordered in Epic   Medication Sig Dispense Refill    valsartan (DIOVAN) 320 MG tablet Take 1 Tablet by mouth every day. 90 Tablet 3    esomeprazole (NEXIUM) 40 MG delayed-release capsule Take 1 Capsule by mouth every day. 90 Capsule 3    ELIQUIS 5 MG Tab Take 1 Tablet by mouth 2 times a day. 180 Tablet 3    carvedilol (COREG) 25 MG Tab Take 1 po  Tablet 3    atorvastatin (LIPITOR) 20 MG Tab Take 1 Tablet by mouth every  "day. 100 Tablet 3    busPIRone (BUSPAR) 5 MG tablet Take 1 Tablet by mouth 3 times a day. As needed for anxiety 270 Tablet 3    chlorhexidine (PERIDEX) 0.12 % Solution SWISH 15CC BY MOUTH TWICE A DAY. SPIT DO NOT SWALLOW      triamcinolone acetonide (KENALOG) 0.1 % Cream APPLY TO AFFECTED AREAS ON BACK TWICE A DAY X 14 DAYS/MONTH AS NEEDED .      ibandronate (BONIVA) 150 MG tablet Take 1 Tablet by mouth every 30 days. 3 Tablet 3    Calcium Carbonate-Vitamin D (CALCIUM 500 + D PO) Take  by mouth.      Multiple Vitamins-Minerals (PRESERVISION AREDS 2 PO) Take  by mouth.      Multiple Vitamin (MULTI-VITAMIN) TABS Take  by mouth every day.      Cholecalciferol (VITAMIN D) 1000 UNIT CAPS Take  by mouth every day.       No current [facility]-ordered facility-administered medications on file.       Allergies:  Amlodipine, Ampicillin, and Penicillins    Health Maintenance: Completed    ROS:  Gen: no fevers/chills, no changes in weight  Eyes: no changes in vision  ENT: no sore throat, no hearing loss, no bloody nose  Pulm: no sob, no cough  CV: no chest pain, no palpitations  GI: no nausea/vomiting, no diarrhea  : no dysuria  MSk: no myalgias  Skin: no rash  Neuro: no headaches, no numbness/tingling  Heme/Lymph: no easy bruising      Objective:       Exam:  /74 (BP Location: Right arm, Patient Position: Sitting, BP Cuff Size: Adult)   Pulse 72   Temp 36.5 °C (97.7 °F) (Temporal)   Resp 18   Ht 1.651 m (5' 5\")   Wt 70.8 kg (156 lb)   SpO2 96%   BMI 25.96 kg/m²  Body mass index is 25.96 kg/m².    Gen: Alert and oriented, No apparent distress.  Neck: Neck is supple without lymphadenopathy.  Lungs: Normal effort, CTA bilaterally, no wheezes, rhonchi, or rales  CV: Regular rate and rhythm. No murmurs, rubs, or gallops.  Ext: No clubbing, cyanosis, edema.        Labs: Reviewed with patient    Assessment & Plan:     82 y.o. female with the following -     1. Hyponatremia  This is a chronic problem.  We will see what her " next labs show.  She was told she could have a little bit of salt in her diet as well.    2. Essential hypertension, benign  This is a chronic stable condition.  Continue current medications.    3. Elevated alkaline phosphatase level  This is a chronic problem.  We will continue to monitor.  She was told that the elevation could be due to arthritis or some liver issues.  If it continues to get higher then we will do more testing.    4. Paroxysmal atrial fibrillation (HCC)  This is a chronic problem.  Continue care through cardiology.    We also try to request her records on her last DEXA scan.  She is not sure how long she has been on the Boniva but it may have been more than 10 years.  If so we probably need to stop it.  But first I need to get her DEXA scan to see what her previous diagnoses were.    Return in about 6 months (around 7/27/2023) for Long.  34 minutes spent with the patient discussing her medical issues and concerns.  As well as her lab work.  Please note that this dictation was created using voice recognition software. I have made every reasonable attempt to correct obvious errors, but I expect that there are errors of grammar and possibly content that I did not discover before finalizing the note.

## 2023-01-27 NOTE — ASSESSMENT & PLAN NOTE
This is a chronic problem.  Her alkaline phosphatase was a little elevated on her last testing.  We will continue to follow it and if it elevates further we will do further investigation.

## 2023-03-10 ENCOUNTER — TELEPHONE (OUTPATIENT)
Dept: MEDICAL GROUP | Facility: PHYSICIAN GROUP | Age: 83
End: 2023-03-10
Payer: COMMERCIAL

## 2023-03-10 NOTE — TELEPHONE ENCOUNTER
Patient was called regarding her appointment to get more information. She stated she had one leg and foot swelling for the pass day or two. And she was not sure what may be causing it. She was advised to head to urgent care or ER so she can get an ultrasound done faster to screen for anything serious. She stated ok go ahead and cancel me out with dr oconnor.

## 2023-03-16 ENCOUNTER — TELEPHONE (OUTPATIENT)
Dept: PHYSICAL THERAPY | Facility: REHABILITATION | Age: 83
End: 2023-03-16
Payer: COMMERCIAL

## 2023-03-16 NOTE — OP THERAPY DISCHARGE SUMMARY
Outpatient Physical Therapy  DISCHARGE SUMMARY NOTE      [facility] [place]  [address]  [place] [address]  Phone:  [phone]  Fax:  [phone]    Date of Visit: 03/16/2023    Patient: Amber Roach  YOB: 1940  MRN: [id number]   Referring Provider: Gustavo Aguilar M.D.  [id number] [facility]  [address]  [place] [id number]   Referring Diagnosis Dextroscoliosis [M41.80];DDD (degenerative disc disease), lumbar [M51.36];Lumbar spondylosis [M47.816];Lumbosacral pain [M54.50]       Your patient is being discharged from Physical Therapy with the following comments:   Goals partially met    Comments:  Amber Roach has completed 9 physical therapy sessions. She was last seen on 12/12/22. She has improved function, decreased pain, improved strength, consistent ROM, consistent gait and she continues to progress with her home exercise program. Recommend to discharge patient to full independent home exercise program at this time. Thank you for the opportunity to assist you and your patient.         Limitations Remaining:  At last session: lumbar flexion limited with forward bend to knees. Back extension to neutral with reported discomfort. Rotation 50% bilateral. Hip flexion MMT 2-/5 R, 3+/5 L, LBDQ 18%    Recommendations:  Continue HEP without increasing pain.     Celestino Long, PT, DPT    Date: 3/16/2023

## 2023-03-21 ENCOUNTER — OFFICE VISIT (OUTPATIENT)
Dept: CARDIOLOGY | Facility: MEDICAL CENTER | Age: 83
End: 2023-03-21
Payer: COMMERCIAL

## 2023-03-21 ENCOUNTER — TELEPHONE (OUTPATIENT)
Dept: CARDIOLOGY | Facility: MEDICAL CENTER | Age: 83
End: 2023-03-21

## 2023-03-21 VITALS
SYSTOLIC BLOOD PRESSURE: 140 MMHG | RESPIRATION RATE: 16 BRPM | DIASTOLIC BLOOD PRESSURE: 82 MMHG | HEIGHT: 65 IN | HEART RATE: 66 BPM | BODY MASS INDEX: 25.99 KG/M2 | WEIGHT: 156 LBS | OXYGEN SATURATION: 97 %

## 2023-03-21 DIAGNOSIS — D68.69 HYPERCOAGULABILITY DUE TO ATRIAL FIBRILLATION (HCC): Chronic | ICD-10-CM

## 2023-03-21 DIAGNOSIS — I48.91 HYPERCOAGULABILITY DUE TO ATRIAL FIBRILLATION (HCC): Chronic | ICD-10-CM

## 2023-03-21 DIAGNOSIS — I48.0 PAROXYSMAL ATRIAL FIBRILLATION (HCC): ICD-10-CM

## 2023-03-21 DIAGNOSIS — I10 ESSENTIAL HYPERTENSION, BENIGN: ICD-10-CM

## 2023-03-21 DIAGNOSIS — I49.1 PAC (PREMATURE ATRIAL CONTRACTION): Chronic | ICD-10-CM

## 2023-03-21 PROCEDURE — 99214 OFFICE O/P EST MOD 30 MIN: CPT | Performed by: INTERNAL MEDICINE

## 2023-03-21 RX ORDER — FUROSEMIDE 20 MG/1
20 TABLET ORAL
Qty: 90 TABLET | Refills: 3 | Status: SHIPPED | OUTPATIENT
Start: 2023-03-21 | End: 2024-02-21 | Stop reason: SDUPTHER

## 2023-03-21 RX ORDER — HYDRALAZINE HYDROCHLORIDE 10 MG/1
10 TABLET, FILM COATED ORAL 3 TIMES DAILY PRN
Qty: 50 TABLET | Refills: 3 | Status: SHIPPED | OUTPATIENT
Start: 2023-03-21

## 2023-03-21 ASSESSMENT — FIBROSIS 4 INDEX: FIB4 SCORE: 1.64

## 2023-03-21 NOTE — TELEPHONE ENCOUNTER
Requested recent records STAT from [facility] including discharge summary (1/2023) and EKG. Fax confirmation received. Records pending.

## 2023-03-21 NOTE — PROGRESS NOTES
Chief Complaint   Patient presents with    Dyslipidemia    Hypertension     F/V Dx: Essential hypertension, benign    Atrial Fibrillation       Subjective     Amber Roach is a 82 y.o. female who presents today for follow-up of her history of hypertension dyslipidemia    She reportedly had A-fib was seen at [facility] EKG here shows sinus rhythm with PACs in January    Past Medical History:   Diagnosis Date    Abnormal blood chemistry 2/8/2012    Abnormal electrocardiogram 2/8/2012    Breast cancer (HCC) 2/8/2012    Hyperlipidemia 2/8/2012    Hypertension 2/8/2012    Macular degeneration of right eye 2017    Paroxysmal atrial fibrillation (HCC)     Vitamin d deficiency 2/8/2012     Past Surgical History:   Procedure Laterality Date    ARTHROPLASTY Right 06/2020    right replacement    CHOLECYSTECTOMY  09/2013    LIVER BIOPSY  2013    PBC autoimmmune    LUMPECTOMY Right 01/1994    lumpectomy for cancer    ABDOMINAL HYSTERECTOMY TOTAL  09/1993    APPENDECTOMY  1953    KNEE ARTHROSCOPY Bilateral 1995 and 1996    OTHER      appendectomy 1952 right knee replacement 1995 liver biopsy 2013 hysterectomy 1993 left knee replacement 1997 breast lumpectomy 1994 cholecystectomy 2013     Family History   Problem Relation Age of Onset    Heart Disease Brother     Heart Attack Brother      Social History     Socioeconomic History    Marital status:      Spouse name: Not on file    Number of children: Not on file    Years of education: Not on file    Highest education level: Not on file   Occupational History    Not on file   Tobacco Use    Smoking status: Never    Smokeless tobacco: Never   Vaping Use    Vaping Use: Never used   Substance and Sexual Activity    Alcohol use: Yes     Alcohol/week: 0.6 oz     Types: 1 Glasses of wine per week    Drug use: No    Sexual activity: Not Currently   Other Topics Concern    Not on file   Social History Narrative    Not on file     Social Determinants of Health     Financial  Resource Strain: Not on file   Food Insecurity: Not on file   Transportation Needs: Not on file   Physical Activity: Not on file   Stress: Not on file   Social Connections: Not on file   Intimate Partner Violence: Not on file   Housing Stability: Not on file     Allergies   Allergen Reactions    Amlodipine      hives    Ampicillin     Penicillins      Outpatient Encounter Medications as of 3/21/2023   Medication Sig Dispense Refill    furosemide (LASIX) 20 MG Tab Take 1 Tablet by mouth 1 time a day as needed (swelling). 90 Tablet 3    hydrALAZINE (APRESOLINE) 10 MG Tab Take 1 Tablet by mouth 3 times a day as needed (Systolic Blood Pressure > 170 mmHg). 50 Tablet 3    valsartan (DIOVAN) 320 MG tablet Take 1 Tablet by mouth every day. 90 Tablet 3    esomeprazole (NEXIUM) 40 MG delayed-release capsule Take 1 Capsule by mouth every day. 90 Capsule 3    ELIQUIS 5 MG Tab Take 1 Tablet by mouth 2 times a day. 180 Tablet 3    carvedilol (COREG) 25 MG Tab Take 1 po  Tablet 3    atorvastatin (LIPITOR) 20 MG Tab Take 1 Tablet by mouth every day. 100 Tablet 3    busPIRone (BUSPAR) 5 MG tablet Take 1 Tablet by mouth 3 times a day. As needed for anxiety 270 Tablet 3    chlorhexidine (PERIDEX) 0.12 % Solution SWISH 15CC BY MOUTH TWICE A DAY. SPIT DO NOT SWALLOW      triamcinolone acetonide (KENALOG) 0.1 % Cream APPLY TO AFFECTED AREAS ON BACK TWICE A DAY X 14 DAYS/MONTH AS NEEDED .      ibandronate (BONIVA) 150 MG tablet Take 1 Tablet by mouth every 30 days. 3 Tablet 3    Calcium Carbonate-Vitamin D (CALCIUM 500 + D PO) Take  by mouth.      Multiple Vitamins-Minerals (PRESERVISION AREDS 2 PO) Take  by mouth.      Multiple Vitamin (MULTI-VITAMIN) TABS Take  by mouth every day.      Cholecalciferol (VITAMIN D) 1000 UNIT CAPS Take  by mouth every day.       No facility-administered encounter medications on file as of 3/21/2023.     ROS           Objective     BP (!) 140/82 (BP Location: Left arm, Patient Position: Sitting, BP  "Cuff Size: Adult)   Pulse 66   Resp 16   Ht 1.651 m (5' 5\")   Wt 70.8 kg (156 lb)   SpO2 97%   BMI 25.96 kg/m²     Physical Exam  Constitutional:       General: She is not in acute distress.     Appearance: She is not diaphoretic.   HENT:      Mouth/Throat:      Comments: Wearing a mask for covid precautions  Eyes:      General: No scleral icterus.  Neck:      Vascular: No JVD.   Cardiovascular:      Rate and Rhythm: Normal rate.      Heart sounds: Normal heart sounds. No murmur heard.    No friction rub. No gallop.      Comments: Occasional PAC  Pulmonary:      Effort: No respiratory distress.      Breath sounds: No wheezing or rales.   Abdominal:      General: Bowel sounds are normal.      Palpations: Abdomen is soft.   Musculoskeletal:      Right lower leg: No edema.      Left lower leg: No edema.   Skin:     Findings: No rash.   Neurological:      Mental Status: She is alert. Mental status is at baseline.   Psychiatric:         Mood and Affect: Mood normal.          We reviewed in person the most recent labs  Recent Results (from the past 5040 hour(s))   POCT Urinalysis    Collection Time: 12/30/22  9:42 AM   Result Value Ref Range    POC Color YELLOW Negative    POC Appearance CLEAR Negative    POC Leukocyte Esterase NEG Negative    POC Nitrites NEG Negative    POC Urobiligen 0.2 Negative (0.2) mg/dL    POC Protein NEG Negative mg/dL    POC Urine PH 5.5 5.0 - 8.0    POC Blood TRACE Negative    POC Specific Gravity 1.020 <1.005 - >1.030    POC Ketones NEG Negative mg/dL    POC Bilirubin NEG Negative mg/dL    POC Glucose NEG Negative mg/dL   URINE CULTURE(NEW)    Collection Time: 12/30/22 10:55 AM    Specimen: Urine   Result Value Ref Range    Significant Indicator NEG     Source UR     Site -     Culture Result Usual urogenital elizabeth 10-50,000 cfu/mL    EKG    Collection Time: 01/11/23 12:55 PM   Result Value Ref Range    Report       [facility]    Test Date:  2023-01-11  Pt Name:    MARIELA " GRICELDA                 Department: [facility]  MRN:        [id number]                      Room:  Gender:     Female                       Technician: YOLANDA  :        1940                   Requested By:CARLOS LEVI  Order #:    [id number]                    Reading MD: Carlos Levi MD    Measurements  Intervals                                Axis  Rate:       60                           P:          67  VA:         236                          QRS:        -70  QRSD:       108                          T:          65  QT:         413  QTc:        413    Interpretive Statements  Sinus rhythm  Supraventricular bigeminy  Prolonged VA interval  Left anterior fascicular block  Low voltage, precordial leads  Consider anterior infarct  No previous ECG available for comparison  Electronically Signed On 2023 12:58:30 PST by Carlos Levi MD     CBC WITHOUT DIFFERENTIAL    Collection Time: 23  9:20 AM   Result Value Ref Range    WBC 6.8 4.8 - 10.8 K/uL    RBC 4.00 (L) 4.20 - 5.40 M/uL    Hemoglobin 12.8 12.0 - 16.0 g/dL    Hematocrit 37.2 37.0 - 47.0 %    MCV 93.0 81.4 - 97.8 fL    MCH 32.0 27.0 - 33.0 pg    MCHC 34.4 33.6 - 35.0 g/dL    RDW 40.2 35.9 - 50.0 fL    Platelet Count 264 164 - 446 K/uL    MPV 8.8 (L) 9.0 - 12.9 fL   Comp Metabolic Panel    Collection Time: 23  9:20 AM   Result Value Ref Range    Sodium 127 (L) 135 - 145 mmol/L    Potassium 4.0 3.6 - 5.5 mmol/L    Chloride 90 (L) 96 - 112 mmol/L    Co2 25 20 - 33 mmol/L    Anion Gap 12.0 7.0 - 16.0    Glucose 103 (H) 65 - 99 mg/dL    Bun 15 8 - 22 mg/dL    Creatinine 0.74 0.50 - 1.40 mg/dL    Calcium 9.4 8.5 - 10.5 mg/dL    AST(SGOT) 23 12 - 45 U/L    ALT(SGPT) 19 2 - 50 U/L    Alkaline Phosphatase 115 (H) 30 - 99 U/L    Total Bilirubin 0.7 0.1 - 1.5 mg/dL    Albumin 4.3 3.2 - 4.9 g/dL    Total Protein 7.5 6.0 - 8.2 g/dL    Globulin 3.2 1.9 - 3.5 g/dL    A-G Ratio 1.3 g/dL   Lipid Profile    Collection Time: 23  9:20 AM   Result Value Ref  Range    Cholesterol,Tot 161 100 - 199 mg/dL    Triglycerides 62 0 - 149 mg/dL    HDL 79 >=40 mg/dL    LDL 70 <100 mg/dL   TSH WITH REFLEX TO FT4    Collection Time: 01/13/23  9:20 AM   Result Value Ref Range    TSH 1.190 0.380 - 5.330 uIU/mL   FASTING STATUS    Collection Time: 01/13/23  9:20 AM   Result Value Ref Range    Fasting Status Fasting    ESTIMATED GFR    Collection Time: 01/13/23  9:20 AM   Result Value Ref Range    GFR (CKD-EPI) 81 >60 mL/min/1.73 m 2   CORRECTED CALCIUM    Collection Time: 01/13/23  9:20 AM   Result Value Ref Range    Correct Calcium 9.2 8.5 - 10.5 mg/dL         Assessment & Plan     1. Paroxysmal atrial fibrillation (HCC)        2. Hypercoagulability due to atrial fibrillation (HCC)        3. Essential hypertension, benign  Comp Metabolic Panel      4. PAC (premature atrial contraction)            Medical Decision Making: Today's Assessment/Status/Plan:      It was my pleasure to meet with Ms. Roach.    We addressed the management of hypertension at today's visit. Blood pressure is well controlled.  We specifically assessed the labs on hypertension treatment    We addressed the management of dyslipidemia and atherosclerosis at today's visit. She is on appropriate statin.    We addressed the management of atrial fibrillation.  She is on proper anticoagulation cholesterol management and rate or rhythm control as appropriate.  We addressed the potential side effects and laboratory follow-up for these medications.    We addressed the management of chronic anticoagulation at today's visit. She understands the risks and benefits of chronic anticoagulation.  We reviewed and verified the results of annual testing for anemia and kidney function.    WE NEED TO GET PRIMARY INFO FROM [facility] TO BE CERTAIN SHE HAD AFIB    Added low dose furosemide of HCTZ     I will see Ms. Roach back in 1 year time and encouraged her to follow up with us over the phone or electronically using my MyChart as issues  arise.    It is my pleasure to participate in the care of Ms. Roach.  Please do not hesitate to contact me with questions or concerns.    Wellington Mistry MD PhD [facility]  Cardiologist [facility]    Please note that this dictation was created using voice recognition software. There may be errors I did not discover before finalizing the note.

## 2023-04-11 NOTE — TELEPHONE ENCOUNTER
[facility] sent over records of recent labs of 4/2023 and not what request form states. Labs have been scanned. Request form has been resent for correct records.

## 2023-05-31 ENCOUNTER — APPOINTMENT (RX ONLY)
Dept: URBAN - METROPOLITAN AREA CLINIC 22 | Facility: CLINIC | Age: 83
Setting detail: DERMATOLOGY
End: 2023-05-31

## 2023-05-31 DIAGNOSIS — Z71.89 OTHER SPECIFIED COUNSELING: ICD-10-CM

## 2023-05-31 DIAGNOSIS — D18.0 HEMANGIOMA: ICD-10-CM

## 2023-05-31 DIAGNOSIS — L57.0 ACTINIC KERATOSIS: ICD-10-CM

## 2023-05-31 DIAGNOSIS — D22 MELANOCYTIC NEVI: ICD-10-CM

## 2023-05-31 DIAGNOSIS — L82.1 OTHER SEBORRHEIC KERATOSIS: ICD-10-CM

## 2023-05-31 DIAGNOSIS — L81.4 OTHER MELANIN HYPERPIGMENTATION: ICD-10-CM

## 2023-05-31 PROBLEM — D18.01 HEMANGIOMA OF SKIN AND SUBCUTANEOUS TISSUE: Status: ACTIVE | Noted: 2023-05-31

## 2023-05-31 PROBLEM — D22.5 MELANOCYTIC NEVI OF TRUNK: Status: ACTIVE | Noted: 2023-05-31

## 2023-05-31 PROBLEM — D48.5 NEOPLASM OF UNCERTAIN BEHAVIOR OF SKIN: Status: ACTIVE | Noted: 2023-05-31

## 2023-05-31 PROCEDURE — 11102 TANGNTL BX SKIN SINGLE LES: CPT

## 2023-05-31 PROCEDURE — ? LIQUID NITROGEN

## 2023-05-31 PROCEDURE — 99213 OFFICE O/P EST LOW 20 MIN: CPT | Mod: 25

## 2023-05-31 PROCEDURE — ? COUNSELING

## 2023-05-31 PROCEDURE — ? SUNSCREEN RECOMMENDATIONS

## 2023-05-31 PROCEDURE — 17003 DESTRUCT PREMALG LES 2-14: CPT

## 2023-05-31 PROCEDURE — 11103 TANGNTL BX SKIN EA SEP/ADDL: CPT

## 2023-05-31 PROCEDURE — 17000 DESTRUCT PREMALG LESION: CPT | Mod: 59

## 2023-05-31 PROCEDURE — ? BIOPSY BY SHAVE METHOD

## 2023-05-31 ASSESSMENT — LOCATION DETAILED DESCRIPTION DERM
LOCATION DETAILED: LEFT NASAL SIDEWALL
LOCATION DETAILED: LEFT PROXIMAL PRETIBIAL REGION
LOCATION DETAILED: NASAL DORSUM
LOCATION DETAILED: LEFT LATERAL ABDOMEN
LOCATION DETAILED: LEFT ANTERIOR SHOULDER
LOCATION DETAILED: RIGHT MID-UPPER BACK
LOCATION DETAILED: LEFT INFERIOR UPPER BACK
LOCATION DETAILED: LEFT CENTRAL MALAR CHEEK
LOCATION DETAILED: LEFT SUPERIOR LATERAL MALAR CHEEK
LOCATION DETAILED: NASAL ROOT
LOCATION DETAILED: RIGHT NASAL SIDEWALL
LOCATION DETAILED: LEFT PROXIMAL DORSAL FOREARM

## 2023-05-31 ASSESSMENT — LOCATION SIMPLE DESCRIPTION DERM
LOCATION SIMPLE: LEFT NOSE
LOCATION SIMPLE: LEFT SHOULDER
LOCATION SIMPLE: ABDOMEN
LOCATION SIMPLE: RIGHT UPPER BACK
LOCATION SIMPLE: LEFT FOREARM
LOCATION SIMPLE: LEFT CHEEK
LOCATION SIMPLE: RIGHT NOSE
LOCATION SIMPLE: LEFT PRETIBIAL REGION
LOCATION SIMPLE: LEFT UPPER BACK
LOCATION SIMPLE: NOSE

## 2023-05-31 ASSESSMENT — LOCATION ZONE DERM
LOCATION ZONE: FACE
LOCATION ZONE: LEG
LOCATION ZONE: NOSE
LOCATION ZONE: TRUNK
LOCATION ZONE: ARM

## 2023-05-31 NOTE — PROCEDURE: BIOPSY BY SHAVE METHOD
Detail Level: Detailed
Depth Of Biopsy: dermis
Was A Bandage Applied: Yes
Size Of Lesion In Cm: 1.5
X Size Of Lesion In Cm: 0
Biopsy Type: H and E
Biopsy Method: Dermablade
Anesthesia Type: 1% lidocaine with epinephrine
Anesthesia Volume In Cc: 0.5
Hemostasis: Drysol
Wound Care: Petrolatum
Dressing: bandage
Destruction After The Procedure: No
Type Of Destruction Used: Curettage
Curettage Text: The wound bed was treated with curettage after the biopsy was performed.
Cryotherapy Text: The wound bed was treated with cryotherapy after the biopsy was performed.
Electrodesiccation Text: The wound bed was treated with electrodesiccation after the biopsy was performed.
Electrodesiccation And Curettage Text: The wound bed was treated with electrodesiccation and curettage after the biopsy was performed.
Silver Nitrate Text: The wound bed was treated with silver nitrate after the biopsy was performed.
Lab: [id number]
Lab Facility: 
Consent: Written consent was obtained and risks were reviewed including but not limited to scarring, infection, bleeding, scabbing, incomplete removal, nerve damage and allergy to anesthesia.
Post-Care Instructions: I reviewed with the patient in detail post-care instructions. Patient is to keep the biopsy site dry overnight, and then apply bacitracin twice daily until healed. Patient may apply hydrogen peroxide soaks to remove any crusting.
Notification Instructions: Patient will be notified of biopsy results. However, patient instructed to call the office if not contacted within 2 weeks.
Billing Type: Third-Party Bill
Information: Selecting Yes will display possible errors in your note based on the variables you have selected. This validation is only offered as a suggestion for you. PLEASE NOTE THAT THE VALIDATION TEXT WILL BE REMOVED WHEN YOU FINALIZE YOUR NOTE. IF YOU WANT TO FAX A PRELIMINARY NOTE YOU WILL NEED TO TOGGLE THIS TO 'NO' IF YOU DO NOT WANT IT IN YOUR FAXED NOTE.
Size Of Lesion In Cm: 0.3

## 2023-05-31 NOTE — PROCEDURE: LIQUID NITROGEN
Render Post-Care Instructions In Note?: no
Detail Level: Detailed
Show Applicator Variable?: Yes
Consent: The patient's consent was obtained including but not limited to risks of crusting, scabbing, blistering, scarring, darker or lighter pigmentary change, recurrence, incomplete removal and infection.
Post-Care Instructions: I reviewed with the patient in detail post-care instructions. Patient is to wear sunprotection, and avoid picking at any of the treated lesions. Pt may apply Vaseline to crusted or scabbing areas.
Duration Of Freeze Thaw-Cycle (Seconds): 3
Number Of Freeze-Thaw Cycles: 2 freeze-thaw cycles

## 2023-06-27 RX ORDER — IBANDRONATE SODIUM 150 MG/1
150 TABLET, FILM COATED ORAL
Qty: 3 TABLET | Refills: 3 | Status: SHIPPED | OUTPATIENT
Start: 2023-06-27 | End: 2023-08-22

## 2023-08-22 ENCOUNTER — HOSPITAL ENCOUNTER (OUTPATIENT)
Dept: LAB | Facility: MEDICAL CENTER | Age: 83
End: 2023-08-22
Attending: FAMILY MEDICINE
Payer: COMMERCIAL

## 2023-08-22 ENCOUNTER — OFFICE VISIT (OUTPATIENT)
Dept: MEDICAL GROUP | Facility: PHYSICIAN GROUP | Age: 83
End: 2023-08-22
Payer: COMMERCIAL

## 2023-08-22 VITALS
HEIGHT: 65 IN | HEART RATE: 64 BPM | OXYGEN SATURATION: 97 % | WEIGHT: 155 LBS | SYSTOLIC BLOOD PRESSURE: 126 MMHG | DIASTOLIC BLOOD PRESSURE: 74 MMHG | RESPIRATION RATE: 18 BRPM | BODY MASS INDEX: 25.83 KG/M2 | TEMPERATURE: 97.8 F

## 2023-08-22 DIAGNOSIS — R74.8 ELEVATED ALKALINE PHOSPHATASE LEVEL: ICD-10-CM

## 2023-08-22 DIAGNOSIS — E87.1 HYPONATREMIA: ICD-10-CM

## 2023-08-22 DIAGNOSIS — I10 ESSENTIAL HYPERTENSION, BENIGN: ICD-10-CM

## 2023-08-22 DIAGNOSIS — M15.9 PRIMARY OSTEOARTHRITIS INVOLVING MULTIPLE JOINTS: ICD-10-CM

## 2023-08-22 DIAGNOSIS — M81.0 AGE-RELATED OSTEOPOROSIS WITHOUT CURRENT PATHOLOGICAL FRACTURE: ICD-10-CM

## 2023-08-22 PROBLEM — M15.0 PRIMARY OSTEOARTHRITIS INVOLVING MULTIPLE JOINTS: Status: ACTIVE | Noted: 2023-08-22

## 2023-08-22 LAB
ALBUMIN SERPL BCP-MCNC: 4.2 G/DL (ref 3.2–4.9)
ALP SERPL-CCNC: 191 U/L (ref 30–99)
ALT SERPL-CCNC: 32 U/L (ref 2–50)
AST SERPL-CCNC: 40 U/L (ref 12–45)
BILIRUB CONJ SERPL-MCNC: <0.2 MG/DL (ref 0.1–0.5)
BILIRUB INDIRECT SERPL-MCNC: ABNORMAL MG/DL (ref 0–1)
BILIRUB SERPL-MCNC: 0.4 MG/DL (ref 0.1–1.5)
PROT SERPL-MCNC: 7.8 G/DL (ref 6–8.2)

## 2023-08-22 PROCEDURE — 84075 ASSAY ALKALINE PHOSPHATASE: CPT

## 2023-08-22 PROCEDURE — 3078F DIAST BP <80 MM HG: CPT | Performed by: FAMILY MEDICINE

## 2023-08-22 PROCEDURE — 36415 COLL VENOUS BLD VENIPUNCTURE: CPT

## 2023-08-22 PROCEDURE — 99214 OFFICE O/P EST MOD 30 MIN: CPT | Performed by: FAMILY MEDICINE

## 2023-08-22 PROCEDURE — 1170F FXNL STATUS ASSESSED: CPT | Performed by: FAMILY MEDICINE

## 2023-08-22 PROCEDURE — 84080 ASSAY ALKALINE PHOSPHATASES: CPT

## 2023-08-22 PROCEDURE — 80076 HEPATIC FUNCTION PANEL: CPT

## 2023-08-22 PROCEDURE — 3074F SYST BP LT 130 MM HG: CPT | Performed by: FAMILY MEDICINE

## 2023-08-22 ASSESSMENT — FIBROSIS 4 INDEX: FIB4 SCORE: 1.64

## 2023-08-22 NOTE — ASSESSMENT & PLAN NOTE
This is a chronic problem.  Patient states that she is starting to feel a little sluggish and tired in the mid afternoon.  On review of her medications I told her it might be due to taking her blood pressure pills in the morning.  We will have her take her valsartan in the evening and see if that helps.

## 2023-08-22 NOTE — ASSESSMENT & PLAN NOTE
This is a chronic problem.  Patient's been on Boniva she thinks for 10 years or longer.  Her dentist saw her due to a sore on her left gum.  She was sent to the specialist and they feel that some of her issues may be related to the Boniva.  We will have her stop that which she did 2 months ago and follow-up.  She is to due to have a DEXA scan around November.

## 2023-08-22 NOTE — PROGRESS NOTES
Here to discuss subjective:     CC: Here to discuss several medical issues.    HPI:   Amber presents today with the following medical concerns:    Elevated alkaline phosphatase level  This is a new issue.  Patient had mild elevation of her alkaline phosphatase on our last level but the one done at a recent health fair on August 3 showed a level of 218.  Also her AST was slightly elevated at 47.  She did have a liver ultrasound a couple years ago due to elevated alkaline phosphatase and it did not show any liver abnormalities.    Primary osteoarthritis involving multiple joints  This is a chronic problem.  Patient has arthritis involving her lower back and multiple joints in her feet.  She cannot take NSAIDs as she is on Eliquis.  She does state that Tylenol does help.  She was encouraged to use that as needed.    Hyponatremia  This is a chronic stable condition.  Patient's last sodium was mildly deep pressed at 132.  Similar to what she has had in the past.    Essential hypertension, benign  This is a chronic problem.  Patient states that she is starting to feel a little sluggish and tired in the mid afternoon.  On review of her medications I told her it might be due to taking her blood pressure pills in the morning.  We will have her take her valsartan in the evening and see if that helps.    Age-related osteoporosis without current pathological fracture  This is a chronic problem.  Patient's been on Boniva she thinks for 10 years or longer.  Her dentist saw her due to a sore on her left gum.  She was sent to the specialist and they feel that some of her issues may be related to the Boniva.  We will have her stop that which she did 2 months ago and follow-up.  She is to due to have a DEXA scan around November.    Past Medical History:   Diagnosis Date    Abnormal blood chemistry 2/8/2012    Abnormal electrocardiogram 2/8/2012    Breast cancer (HCC) 2/8/2012    Hyperlipidemia 2/8/2012    Hypertension 2/8/2012     Macular degeneration of right eye 2017    Paroxysmal atrial fibrillation (HCC)     Vitamin d deficiency 2/8/2012       Social History     Tobacco Use    Smoking status: Never    Smokeless tobacco: Never   Vaping Use    Vaping Use: Never used   Substance Use Topics    Alcohol use: Yes     Alcohol/week: 0.6 oz     Types: 1 Glasses of wine per week     Comment: occ    Drug use: No       Current Outpatient Medications Ordered in Epic   Medication Sig Dispense Refill    furosemide (LASIX) 20 MG Tab Take 1 Tablet by mouth 1 time a day as needed (swelling). 90 Tablet 3    hydrALAZINE (APRESOLINE) 10 MG Tab Take 1 Tablet by mouth 3 times a day as needed (Systolic Blood Pressure > 170 mmHg). 50 Tablet 3    valsartan (DIOVAN) 320 MG tablet Take 1 Tablet by mouth every day. 90 Tablet 3    esomeprazole (NEXIUM) 40 MG delayed-release capsule Take 1 Capsule by mouth every day. 90 Capsule 3    ELIQUIS 5 MG Tab Take 1 Tablet by mouth 2 times a day. 180 Tablet 3    carvedilol (COREG) 25 MG Tab Take 1 po  Tablet 3    atorvastatin (LIPITOR) 20 MG Tab Take 1 Tablet by mouth every day. 100 Tablet 3    busPIRone (BUSPAR) 5 MG tablet Take 1 Tablet by mouth 3 times a day. As needed for anxiety 270 Tablet 3    chlorhexidine (PERIDEX) 0.12 % Solution SWISH 15CC BY MOUTH TWICE A DAY. SPIT DO NOT SWALLOW      triamcinolone acetonide (KENALOG) 0.1 % Cream APPLY TO AFFECTED AREAS ON BACK TWICE A DAY X 14 DAYS/MONTH AS NEEDED .      Calcium Carbonate-Vitamin D (CALCIUM 500 + D PO) Take  by mouth.      Multiple Vitamins-Minerals (PRESERVISION AREDS 2 PO) Take  by mouth.      Multiple Vitamin (MULTI-VITAMIN) TABS Take  by mouth every day.      Cholecalciferol (VITAMIN D) 1000 UNIT CAPS Take  by mouth every day.       No current [facility]-ordered facility-administered medications on file.       Allergies:  Amlodipine, Ampicillin, and Penicillins    Health Maintenance: Completed    ROS:  Gen: no fevers/chills, no changes in weight  Eyes: no changes  "in vision  ENT: no sore throat, no hearing loss, no bloody nose  Pulm: no sob, no cough  CV: no chest pain, no palpitations  GI: no nausea/vomiting, no diarrhea  : no dysuria  MSk: no myalgias  Skin: no rash  Neuro: no headaches, no numbness/tingling  Heme/Lymph: no easy bruising      Objective:       Exam:  /74 (BP Location: Left arm, Patient Position: Sitting, BP Cuff Size: Adult)   Pulse 64   Temp 36.6 °C (97.8 °F) (Temporal)   Resp 18   Ht 1.651 m (5' 5\")   Wt 70.3 kg (155 lb)   SpO2 97%   BMI 25.79 kg/m²  Body mass index is 25.79 kg/m².    Gen: Alert and oriented, No apparent distress.  Ext: No clubbing, cyanosis, edema.  Gait is normal      Labs: Reviewed    Assessment & Plan:     82 y.o. female with the following -     1. Elevated alkaline phosphatase level  This is a new issue with a marked elevation of her alkaline phosphatase compared to her previous values.  We will recheck that with isoenzymes.  If it still elevated and is suggests bone etiology we will do a bone scan.  If it shows hepatic etiology we will redo her ultrasound.  - ALKALINE PHOSPHATASE ISOENZYMES; Future  - HEPATIC FUNCTION PANEL; Future    2. Primary osteoarthritis involving multiple joints  This is a chronic problem.  I told her she could continue on the Tylenol as that seems to help her.    3. Hyponatremia  This is a chronic stable condition.  Continue to follow.    4. Essential hypertension, benign  This is a chronic problem.  We will have her take her valsartan in the evening and see if that helps her feel better in the afternoons.    5. Age-related osteoporosis without current pathological fracture  This is a chronic problem.  We will have her remain off the Boniva for now.  Recheck a DEXA scan around November.    32 minutes was spent with the patient.  Return in about 6 months (around 2/22/2024) for Long.    Please note that this dictation was created using voice recognition software. I have made every reasonable " attempt to correct obvious errors, but I expect that there are errors of grammar and possibly content that I did not discover before finalizing the note.

## 2023-08-22 NOTE — ASSESSMENT & PLAN NOTE
This is a chronic stable condition.  Patient's last sodium was mildly deep pressed at 132.  Similar to what she has had in the past.

## 2023-08-22 NOTE — ASSESSMENT & PLAN NOTE
This is a new issue.  Patient had mild elevation of her alkaline phosphatase on our last level but the one done at a recent health fair on August 3 showed a level of 218.  Also her AST was slightly elevated at 47.  She did have a liver ultrasound a couple years ago due to elevated alkaline phosphatase and it did not show any liver abnormalities.

## 2023-08-22 NOTE — ASSESSMENT & PLAN NOTE
This is a chronic problem.  Patient has arthritis involving her lower back and multiple joints in her feet.  She cannot take NSAIDs as she is on Eliquis.  She does state that Tylenol does help.  She was encouraged to use that as needed.

## 2023-08-25 LAB
ALP BONE SERPL-CCNC: 46 U/L (ref 0–55)
ALP ISOS SERPL HS-CCNC: 0 U/L
ALP LIVER SERPL-CCNC: 154 U/L (ref 0–94)
ALP SERPL-CCNC: 200 U/L (ref 40–120)

## 2023-08-28 DIAGNOSIS — R74.8 ELEVATED ALKALINE PHOSPHATASE LEVEL: ICD-10-CM

## 2023-08-30 ENCOUNTER — HOSPITAL ENCOUNTER (OUTPATIENT)
Dept: LAB | Facility: MEDICAL CENTER | Age: 83
End: 2023-08-30
Attending: FAMILY MEDICINE
Payer: COMMERCIAL

## 2023-08-30 ENCOUNTER — HOSPITAL ENCOUNTER (OUTPATIENT)
Dept: RADIOLOGY | Facility: MEDICAL CENTER | Age: 83
End: 2023-08-30
Attending: FAMILY MEDICINE
Payer: COMMERCIAL

## 2023-08-30 DIAGNOSIS — R74.8 ELEVATED ALKALINE PHOSPHATASE LEVEL: ICD-10-CM

## 2023-08-30 PROCEDURE — 36415 COLL VENOUS BLD VENIPUNCTURE: CPT

## 2023-08-30 PROCEDURE — 76700 US EXAM ABDOM COMPLETE: CPT

## 2023-08-30 PROCEDURE — 86381 MITOCHONDRIAL ANTIBODY EACH: CPT

## 2023-09-01 DIAGNOSIS — K74.3 PRIMARY BILIARY CHOLANGITIS (HCC): ICD-10-CM

## 2023-09-01 LAB — MITOCHONDRIA M2 IGG SER-ACNC: 135.6 UNITS (ref 0–24.9)

## 2023-10-17 ENCOUNTER — TELEPHONE (OUTPATIENT)
Dept: CARDIOLOGY | Facility: MEDICAL CENTER | Age: 83
End: 2023-10-17
Payer: COMMERCIAL

## 2023-10-17 NOTE — LETTER
PROCEDURE/SURGERY CLEARANCE FORM    Date: 10/17/2023   Patient Name: Amber Roach    Dear Surgeon or Proceduralist,      Thank you for your request for cardiac stratification of our mutual patient Amber Roach 1940. We have reviewed their [facility] records; and to the best of our understanding this patient has not had stenting, ablation, cardiothoracic surgery or hospitalization for cardiovascular reasons in the past 6 months.  Amber Roach has been seen within the past 18 months and is considered to have non-modifiable cardiac risk for this low-risk procedure/surgery. They may proceed from a cardiovascular standpoint and may hold their antiplatelet/anticoagulation as briefly as possible. Please have patient resume this medication when hemodynamically stable to do so.     Aspirin or Prasugrel   - hold 7 days prior to procedure/surgery, resume when hemodynamically stable      Clopidrogrel or Ticagrelor  - hold 7 days for all neurological procedures, hold 5 days prior to all other procedure/surgery,  resume when hemodynamically stable     Warfarin - hold 7 days for all neurological procedures, hold 5 days prior to all other procedure/surgery and coordinate with [facility] ([phone]) INR testing and dose management.      Pradaxa/Xarelto/Eliquis/Savesya - hold 1 day prior to procedure for low bleeding risk procedure, 2 days for high bleeding risk procedure, or consider holding 3 days or longer for patients with reduced kidney function (CrCl <30mL/min) or spinal/cranial surgeries/procedures.      If they have a mechanical heart valve, please coordinate with [facility] ([phone]) the proper management of their anticoagulant in the periprocedural or perioperative period.      Some patients have higher risk for cardiovascular complications or holding medication. If our patient has had prior complications of holding antiplatelet or anticoagulants in the past and  we have seen them after these events, we have addressed these concerns with the patient. They are at an unknown degree of increased risk for recurrent complication.  You may hold anticoagulation/antiplatelets for the procedure or surgery if the benefits of the procedure or surgery outweigh this nonmodifiable risk.      If Amber Roach 1940 has new symptoms of heart failure decompensation, unstable arrythmia, or angina please reach out and we will assess the patient.      If you have other patient-specific concerns, please feel free to reach out to the patient's cardiologist directly at [phone].     Thank you,       [facility]

## 2023-10-17 NOTE — TELEPHONE ENCOUNTER
Last OV: 3/21/2023  Proposed Surgery: Dental procedure  Surgery Date: 11/9/2023  Requesting Office Name: Oral & Maxillofacial Surgery  Fax Number: [phone]  Preference of Location (default is surgery center unless specified by Cardiologist or [facility])  Prior Clearance Addressed: No      Anticoags/Antiplatelets: Apixaban   Outstanding Cardiac Imaging : No  Stent, Cardiac Devices, or Catheterization: No  Ablation, TAVR/Valve (including open heart), Cardioversion: No  Recent Cardiac Hospitalization: No            When: N/A  History (cardiac history):   Past Medical History:   Diagnosis Date    Abnormal blood chemistry 2/8/2012    Abnormal electrocardiogram 2/8/2012    Breast cancer (HCC) 2/8/2012    Hyperlipidemia 2/8/2012    Hypertension 2/8/2012    Macular degeneration of right eye 2017    Paroxysmal atrial fibrillation (HCC)     Vitamin d deficiency 2/8/2012             Surgical Clearance Letter Sent: YES   **Scan clearance request letter into [facility].**

## 2023-11-29 ENCOUNTER — APPOINTMENT (RX ONLY)
Dept: URBAN - METROPOLITAN AREA CLINIC 22 | Facility: CLINIC | Age: 83
Setting detail: DERMATOLOGY
End: 2023-11-29

## 2023-11-29 DIAGNOSIS — L82.1 OTHER SEBORRHEIC KERATOSIS: ICD-10-CM

## 2023-11-29 DIAGNOSIS — Z87.2 PERSONAL HISTORY OF DISEASES OF THE SKIN AND SUBCUTANEOUS TISSUE: ICD-10-CM

## 2023-11-29 DIAGNOSIS — Z71.89 OTHER SPECIFIED COUNSELING: ICD-10-CM

## 2023-11-29 DIAGNOSIS — D22 MELANOCYTIC NEVI: ICD-10-CM

## 2023-11-29 DIAGNOSIS — D18.0 HEMANGIOMA: ICD-10-CM

## 2023-11-29 DIAGNOSIS — L81.4 OTHER MELANIN HYPERPIGMENTATION: ICD-10-CM

## 2023-11-29 DIAGNOSIS — L57.0 ACTINIC KERATOSIS: ICD-10-CM

## 2023-11-29 PROBLEM — D22.5 MELANOCYTIC NEVI OF TRUNK: Status: ACTIVE | Noted: 2023-11-29

## 2023-11-29 PROBLEM — D18.01 HEMANGIOMA OF SKIN AND SUBCUTANEOUS TISSUE: Status: ACTIVE | Noted: 2023-11-29

## 2023-11-29 PROCEDURE — ? LIQUID NITROGEN

## 2023-11-29 PROCEDURE — 17000 DESTRUCT PREMALG LESION: CPT

## 2023-11-29 PROCEDURE — ? COUNSELING

## 2023-11-29 PROCEDURE — 99213 OFFICE O/P EST LOW 20 MIN: CPT | Mod: 25

## 2023-11-29 PROCEDURE — ? SUNSCREEN RECOMMENDATIONS

## 2023-11-29 ASSESSMENT — LOCATION DETAILED DESCRIPTION DERM
LOCATION DETAILED: LEFT INFERIOR UPPER BACK
LOCATION DETAILED: LEFT PROXIMAL DORSAL FOREARM
LOCATION DETAILED: LEFT LATERAL ABDOMEN
LOCATION DETAILED: LEFT PROXIMAL PRETIBIAL REGION
LOCATION DETAILED: LEFT CENTRAL EYEBROW
LOCATION DETAILED: RIGHT MID-UPPER BACK

## 2023-11-29 ASSESSMENT — LOCATION SIMPLE DESCRIPTION DERM
LOCATION SIMPLE: ABDOMEN
LOCATION SIMPLE: LEFT UPPER BACK
LOCATION SIMPLE: LEFT EYEBROW
LOCATION SIMPLE: RIGHT UPPER BACK
LOCATION SIMPLE: LEFT PRETIBIAL REGION
LOCATION SIMPLE: LEFT FOREARM

## 2023-11-29 ASSESSMENT — LOCATION ZONE DERM
LOCATION ZONE: FACE
LOCATION ZONE: TRUNK
LOCATION ZONE: LEG
LOCATION ZONE: ARM

## 2023-11-29 NOTE — PROCEDURE: LIQUID NITROGEN
Detail Level: Detailed
Show Applicator Variable?: Yes
Post-Care Instructions: I reviewed with the patient in detail post-care instructions. Patient is to wear sunprotection, and avoid picking at any of the treated lesions. Pt may apply Vaseline to crusted or scabbing areas.
Duration Of Freeze Thaw-Cycle (Seconds): 3
Render Note In Bullet Format When Appropriate: No
Number Of Freeze-Thaw Cycles: 2 freeze-thaw cycles
Consent: The patient's verbal consent was obtained including but not limited to risks of crusting, scabbing, blistering, scarring, darker or lighter pigmentary change, recurrence, incomplete removal and infection.

## 2023-12-18 RX ORDER — ATORVASTATIN CALCIUM 20 MG/1
20 TABLET, FILM COATED ORAL DAILY
Qty: 100 TABLET | Refills: 3 | Status: SHIPPED | OUTPATIENT
Start: 2023-12-18

## 2023-12-18 RX ORDER — APIXABAN 5 MG/1
5 TABLET, FILM COATED ORAL 2 TIMES DAILY
Qty: 180 TABLET | Refills: 3 | Status: SHIPPED | OUTPATIENT
Start: 2023-12-18 | End: 2024-01-24

## 2023-12-18 RX ORDER — BUSPIRONE HYDROCHLORIDE 5 MG/1
5 TABLET ORAL 3 TIMES DAILY
Qty: 270 TABLET | Refills: 3 | Status: SHIPPED | OUTPATIENT
Start: 2023-12-18

## 2023-12-18 RX ORDER — CARVEDILOL 25 MG/1
TABLET ORAL
Qty: 180 TABLET | Refills: 3 | Status: SHIPPED | OUTPATIENT
Start: 2023-12-18

## 2023-12-26 RX ORDER — ESOMEPRAZOLE MAGNESIUM 40 MG/1
40 CAPSULE, DELAYED RELEASE ORAL DAILY
Qty: 90 CAPSULE | Refills: 1 | Status: SHIPPED | OUTPATIENT
Start: 2023-12-26

## 2024-01-02 RX ORDER — VALSARTAN 320 MG/1
320 TABLET ORAL DAILY
Qty: 90 TABLET | Refills: 3 | Status: SHIPPED | OUTPATIENT
Start: 2024-01-02

## 2024-01-16 ENCOUNTER — OFFICE VISIT (OUTPATIENT)
Dept: CARDIOLOGY | Facility: MEDICAL CENTER | Age: 84
End: 2024-01-16
Attending: INTERNAL MEDICINE
Payer: COMMERCIAL

## 2024-01-16 VITALS
BODY MASS INDEX: 26.33 KG/M2 | SYSTOLIC BLOOD PRESSURE: 136 MMHG | OXYGEN SATURATION: 96 % | RESPIRATION RATE: 18 BRPM | HEIGHT: 65 IN | DIASTOLIC BLOOD PRESSURE: 84 MMHG | HEART RATE: 67 BPM | WEIGHT: 158 LBS

## 2024-01-16 DIAGNOSIS — I10 ESSENTIAL HYPERTENSION, BENIGN: ICD-10-CM

## 2024-01-16 DIAGNOSIS — E78.5 DYSLIPIDEMIA: ICD-10-CM

## 2024-01-16 DIAGNOSIS — I48.0 PAROXYSMAL ATRIAL FIBRILLATION (HCC): Chronic | ICD-10-CM

## 2024-01-16 DIAGNOSIS — I48.0 HYPERCOAGULABLE STATE DUE TO PAROXYSMAL ATRIAL FIBRILLATION (HCC): Chronic | ICD-10-CM

## 2024-01-16 DIAGNOSIS — D68.69 HYPERCOAGULABLE STATE DUE TO PAROXYSMAL ATRIAL FIBRILLATION (HCC): Chronic | ICD-10-CM

## 2024-01-16 DIAGNOSIS — I49.1 PAC (PREMATURE ATRIAL CONTRACTION): Chronic | ICD-10-CM

## 2024-01-16 PROCEDURE — 3075F SYST BP GE 130 - 139MM HG: CPT | Performed by: INTERNAL MEDICINE

## 2024-01-16 PROCEDURE — 99213 OFFICE O/P EST LOW 20 MIN: CPT | Performed by: INTERNAL MEDICINE

## 2024-01-16 PROCEDURE — 1170F FXNL STATUS ASSESSED: CPT | Performed by: INTERNAL MEDICINE

## 2024-01-16 PROCEDURE — 3079F DIAST BP 80-89 MM HG: CPT | Performed by: INTERNAL MEDICINE

## 2024-01-16 PROCEDURE — 99214 OFFICE O/P EST MOD 30 MIN: CPT | Performed by: INTERNAL MEDICINE

## 2024-01-16 ASSESSMENT — FIBROSIS 4 INDEX: FIB4 SCORE: 2.22

## 2024-01-16 NOTE — PROGRESS NOTES
Chief Complaint   Patient presents with    Atrial Fibrillation     F/V Dx: Paroxysmal atrial fibrillation (HCC)    Dyslipidemia       Subjective     Amber Roach is a 83 y.o. female who presents today for follow-up of her history of hypertension dyslipidemia         Past Medical History:   Diagnosis Date    Abnormal blood chemistry 2/8/2012    Abnormal electrocardiogram 2/8/2012    Breast cancer (HCC) 2/8/2012    Hyperlipidemia 2/8/2012    Hypertension 2/8/2012    Macular degeneration of right eye 2017    Paroxysmal atrial fibrillation (HCC)     Vitamin d deficiency 2/8/2012     Past Surgical History:   Procedure Laterality Date    ARTHROPLASTY Right 06/2020    right replacement    CHOLECYSTECTOMY  09/2013    LIVER BIOPSY  2013    PBC autoimmmune    LUMPECTOMY Right 01/1994    lumpectomy for cancer    ABDOMINAL HYSTERECTOMY TOTAL  09/1993    APPENDECTOMY  1953    KNEE ARTHROSCOPY Bilateral 1995 and 1996    OTHER      appendectomy 1952 right knee replacement 1995 liver biopsy 2013 hysterectomy 1993 left knee replacement 1997 breast lumpectomy 1994 cholecystectomy 2013     Family History   Problem Relation Age of Onset    Heart Disease Brother     Heart Attack Brother      Social History     Socioeconomic History    Marital status:      Spouse name: Not on file    Number of children: Not on file    Years of education: Not on file    Highest education level: Not on file   Occupational History    Not on file   Tobacco Use    Smoking status: Never    Smokeless tobacco: Never   Vaping Use    Vaping Use: Never used   Substance and Sexual Activity    Alcohol use: Yes     Alcohol/week: 0.6 oz     Types: 1 Glasses of wine per week     Comment: occ    Drug use: No    Sexual activity: Not Currently   Other Topics Concern    Not on file   Social History Narrative    Not on file     Social Determinants of Health     Financial Resource Strain: Not on file   Food Insecurity: Not on file   Transportation Needs: Not on  "file   Physical Activity: Not on file   Stress: Not on file   Social Connections: Not on file   Intimate Partner Violence: Not on file   Housing Stability: Not on file     Allergies   Allergen Reactions    Amlodipine      hives    Ampicillin     Penicillins      Outpatient Encounter Medications as of 1/16/2024   Medication Sig Dispense Refill    valsartan (DIOVAN) 320 MG tablet Take 1 Tablet by mouth every day. 90 Tablet 3    esomeprazole (NEXIUM) 40 MG delayed-release capsule Take 1 Capsule by mouth every day. 90 Capsule 1    ELIQUIS 5 MG Tab Take 1 Tablet by mouth 2 times a day. 180 Tablet 3    atorvastatin (LIPITOR) 20 MG Tab Take 1 Tablet by mouth every day. 100 Tablet 3    carvedilol (COREG) 25 MG Tab Take 1 po  Tablet 3    busPIRone (BUSPAR) 5 MG tablet Take 1 Tablet by mouth 3 times a day. As needed for anxiety 270 Tablet 3    furosemide (LASIX) 20 MG Tab Take 1 Tablet by mouth 1 time a day as needed (swelling). 90 Tablet 3    hydrALAZINE (APRESOLINE) 10 MG Tab Take 1 Tablet by mouth 3 times a day as needed (Systolic Blood Pressure > 170 mmHg). 50 Tablet 3    chlorhexidine (PERIDEX) 0.12 % Solution SWISH 15CC BY MOUTH TWICE A DAY. SPIT DO NOT SWALLOW      triamcinolone acetonide (KENALOG) 0.1 % Cream APPLY TO AFFECTED AREAS ON BACK TWICE A DAY X 14 DAYS/MONTH AS NEEDED .      Calcium Carbonate-Vitamin D (CALCIUM 500 + D PO) Take  by mouth.      Multiple Vitamins-Minerals (PRESERVISION AREDS 2 PO) Take  by mouth.      Multiple Vitamin (MULTI-VITAMIN) TABS Take  by mouth every day.      Cholecalciferol (VITAMIN D) 1000 UNIT CAPS Take  by mouth every day.       No facility-administered encounter medications on file as of 1/16/2024.     ROS           Objective     /84 (BP Location: Left arm, Patient Position: Sitting, BP Cuff Size: Adult)   Pulse 67   Resp 18   Ht 1.651 m (5' 5\")   Wt 71.7 kg (158 lb)   SpO2 96%   BMI 26.29 kg/m²     Physical Exam       We reviewed in person the most recent " labs  Recent Results (from the past 5040 hour(s))   HEPATIC FUNCTION PANEL    Collection Time: 08/22/23  1:40 PM   Result Value Ref Range    Alkaline Phosphatase 191 (H) 30 - 99 U/L    AST(SGOT) 40 12 - 45 U/L    ALT(SGPT) 32 2 - 50 U/L    Total Bilirubin 0.4 0.1 - 1.5 mg/dL    Direct Bilirubin <0.2 0.1 - 0.5 mg/dL    Indirect Bilirubin see below 0.0 - 1.0 mg/dL    Albumin 4.2 3.2 - 4.9 g/dL    Total Protein 7.8 6.0 - 8.2 g/dL   ALKALINE PHOSPHATASE ISOENZYMES    Collection Time: 08/22/23  1:40 PM   Result Value Ref Range    Alkaline Phosphatase 200 (H) 40 - 120 U/L    Liver Fractions 154 (H) 0 - 94 U/L    Bone Fractions 46 0 - 55 U/L    Alk Phos Other Calc 0 U/L   MITOCHONDRIAL (M2) AB    Collection Time: 08/30/23 10:34 AM   Result Value Ref Range    Anti-Mitochondrial Ab 135.6 (H) 0.0 - 24.9 Units     From [facility]    Assessment & Plan     1. Essential hypertension, benign        2. Dyslipidemia        3. Paroxysmal atrial fibrillation (HCC)        4. Hypercoagulable state due to paroxysmal atrial fibrillation (HCC)        5. PAC (premature atrial contraction)            Medical Decision Making: Today's Assessment/Status/Plan:      It was my pleasure to meet with Ms. Roach.    We addressed the management of hypertension at today's visit. Blood pressure is well controlled.  We specifically assessed the labs on hypertension treatment    We addressed the management of dyslipidemia and atherosclerosis at today's visit. She is on appropriate statin.    We addressed the management of atrial fibrillation.  She is on proper anticoagulation cholesterol management and rate or rhythm control as appropriate.  We addressed the potential side effects and laboratory follow-up for these medications.    We addressed the management of chronic anticoagulation at today's visit. She understands the risks and benefits of chronic anticoagulation.  We reviewed and verified the results of annual testing for anemia and kidney  function.      WE NEED PRIMARY RECORDS FROM [facility] WILL REACH OUT YET AGAIN she may just have PACs as we saw in urgent care    I will see Ms. Roach back in 1 year time and encouraged her to follow up with us over the phone or electronically using my MyChart as issues arise.    It is my pleasure to participate in the care of Ms. Roach.  Please do not hesitate to contact me with questions or concerns.    Wellington Mistry MD PhD [facility]  Cardiologist [facility]    Please note that this dictation was created using voice recognition software. There may be errors I did not discover before finalizing the note.

## 2024-01-24 ENCOUNTER — TELEPHONE (OUTPATIENT)
Dept: CARDIOLOGY | Facility: MEDICAL CENTER | Age: 84
End: 2024-01-24
Payer: COMMERCIAL

## 2024-01-24 NOTE — TELEPHONE ENCOUNTER
Let her know we finally got the records after multiple requests from [facility] and her EKG in fact does not show atrial fibrillation.  Therefore she can safely stop taking the Eliquis.  We should certainly monitor on a regular basis her heart rhythm and for any palpitations have a low threshold to call us to get a monitor placed.    Overall this is good news

## 2024-01-25 NOTE — TELEPHONE ENCOUNTER
Patient Call  Wellington Mistry M.D.  You7 hours ago (9:48 AM)     Please see telephone note about stopping Eliquis     Reviewed telephone note signed by CW today. Called pt to review CW recommendations.  Pt verbalizes understanding and states no other concerns or questions at this time.  Amber is appreciative of information given.

## 2024-02-21 DIAGNOSIS — I48.0 PAROXYSMAL ATRIAL FIBRILLATION (HCC): ICD-10-CM

## 2024-02-21 RX ORDER — FUROSEMIDE 20 MG/1
20 TABLET ORAL
Qty: 90 TABLET | Refills: 0 | Status: SHIPPED | OUTPATIENT
Start: 2024-02-21

## 2024-03-22 ENCOUNTER — OFFICE VISIT (OUTPATIENT)
Dept: MEDICAL GROUP | Facility: PHYSICIAN GROUP | Age: 84
End: 2024-03-22
Payer: COMMERCIAL

## 2024-03-22 VITALS
TEMPERATURE: 97.8 F | OXYGEN SATURATION: 97 % | RESPIRATION RATE: 18 BRPM | DIASTOLIC BLOOD PRESSURE: 72 MMHG | HEART RATE: 68 BPM | BODY MASS INDEX: 26.33 KG/M2 | WEIGHT: 158 LBS | SYSTOLIC BLOOD PRESSURE: 124 MMHG | HEIGHT: 65 IN

## 2024-03-22 DIAGNOSIS — Z12.31 ENCOUNTER FOR SCREENING MAMMOGRAM FOR MALIGNANT NEOPLASM OF BREAST: ICD-10-CM

## 2024-03-22 DIAGNOSIS — M25.442 SWELLING OF FINGER JOINT OF LEFT HAND: ICD-10-CM

## 2024-03-22 DIAGNOSIS — I10 ESSENTIAL HYPERTENSION, BENIGN: ICD-10-CM

## 2024-03-22 DIAGNOSIS — K74.3 PRIMARY BILIARY CHOLANGITIS (HCC): ICD-10-CM

## 2024-03-22 DIAGNOSIS — R20.2 PARESTHESIA OF ARM: ICD-10-CM

## 2024-03-22 DIAGNOSIS — Z78.0 POSTMENOPAUSAL ESTROGEN DEFICIENCY: ICD-10-CM

## 2024-03-22 PROCEDURE — 3074F SYST BP LT 130 MM HG: CPT | Performed by: FAMILY MEDICINE

## 2024-03-22 PROCEDURE — 3078F DIAST BP <80 MM HG: CPT | Performed by: FAMILY MEDICINE

## 2024-03-22 PROCEDURE — 99214 OFFICE O/P EST MOD 30 MIN: CPT | Performed by: FAMILY MEDICINE

## 2024-03-22 PROCEDURE — 1170F FXNL STATUS ASSESSED: CPT | Performed by: FAMILY MEDICINE

## 2024-03-22 ASSESSMENT — PATIENT HEALTH QUESTIONNAIRE - PHQ9: CLINICAL INTERPRETATION OF PHQ2 SCORE: 0

## 2024-03-22 ASSESSMENT — FIBROSIS 4 INDEX: FIB4 SCORE: 2.22

## 2024-03-22 NOTE — ASSESSMENT & PLAN NOTE
This is likely a chronic problem.  Patient states during the day she has numbness to the left thumb and first 2 fingers.  At night when she lies down she gets numbness to the entire left arm.  She is seeing her chiropractor to no avail.  Tried different positions of her neck and arm during the night and that does not help either.  No history of neck injury.  She feels like her  strength is less than normal.  She has had carpal tunnel syndrome on the right hand.

## 2024-03-22 NOTE — ASSESSMENT & PLAN NOTE
This is a chronic problem.  She was evaluated by digestive [facility] to confirm the diagnosis.  She has elevated alkaline phosphatase which is responded to the treatment they gave her.  And also positive mitrochromal antibodies.

## 2024-03-22 NOTE — ASSESSMENT & PLAN NOTE
This is a chronic problem.  Patient has swelling to the middle joint of the fifth digit left hand.  It is not painful.  But it is different from her other fingers.

## 2024-03-22 NOTE — PROGRESS NOTES
Subjective:     CC: Here for couple of issues.    HPI:   Amber presents today with the following medical concerns:    Paresthesia of arm  This is likely a chronic problem.  Patient states during the day she has numbness to the left thumb and first 2 fingers.  At night when she lies down she gets numbness to the entire left arm.  She is seeing her chiropractor to no avail.  Tried different positions of her neck and arm during the night and that does not help either.  No history of neck injury.  She feels like her  strength is less than normal.  She has had carpal tunnel syndrome on the right hand.    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  She is due for DEXA scan and that will be ordered.    Swelling of finger joint of left hand  This is a chronic problem.  Patient has swelling to the middle joint of the fifth digit left hand.  It is not painful.  But it is different from her other fingers.    Essential hypertension, benign  This is a chronic stable condition.  Blood pressure is well-controlled.    Primary biliary cholangitis (HCC)  This is a chronic problem.  She was evaluated by digestive health to confirm the diagnosis.  She has elevated alkaline phosphatase which is responded to the treatment they gave her.  And also positive mitrochromal antibodies.    Past Medical History:   Diagnosis Date    Abnormal blood chemistry 2/8/2012    Abnormal electrocardiogram 2/8/2012    Breast cancer (HCC) 2/8/2012    Hyperlipidemia 2/8/2012    Hypertension 2/8/2012    Macular degeneration of right eye 2017    Paroxysmal atrial fibrillation (HCC)     Vitamin d deficiency 2/8/2012       Social History     Tobacco Use    Smoking status: Never    Smokeless tobacco: Never   Vaping Use    Vaping Use: Never used   Substance Use Topics    Alcohol use: Yes     Alcohol/week: 0.6 oz     Types: 1 Glasses of wine per week     Comment: occ    Drug use: No       Current Outpatient Medications Ordered in Epic    Medication Sig Dispense Refill    furosemide (LASIX) 20 MG Tab Take 1 Tablet by mouth 1 time a day as needed (swelling). Complete labs to ensure further refills, thank you 90 Tablet 0    valsartan (DIOVAN) 320 MG tablet Take 1 Tablet by mouth every day. 90 Tablet 3    esomeprazole (NEXIUM) 40 MG delayed-release capsule Take 1 Capsule by mouth every day. 90 Capsule 1    atorvastatin (LIPITOR) 20 MG Tab Take 1 Tablet by mouth every day. 100 Tablet 3    carvedilol (COREG) 25 MG Tab Take 1 po  Tablet 3    busPIRone (BUSPAR) 5 MG tablet Take 1 Tablet by mouth 3 times a day. As needed for anxiety 270 Tablet 3    hydrALAZINE (APRESOLINE) 10 MG Tab Take 1 Tablet by mouth 3 times a day as needed (Systolic Blood Pressure > 170 mmHg). 50 Tablet 3    chlorhexidine (PERIDEX) 0.12 % Solution SWISH 15CC BY MOUTH TWICE A DAY. SPIT DO NOT SWALLOW      triamcinolone acetonide (KENALOG) 0.1 % Cream APPLY TO AFFECTED AREAS ON BACK TWICE A DAY X 14 DAYS/MONTH AS NEEDED .      Calcium Carbonate-Vitamin D (CALCIUM 500 + D PO) Take  by mouth.      Multiple Vitamins-Minerals (PRESERVISION AREDS 2 PO) Take  by mouth.      Multiple Vitamin (MULTI-VITAMIN) TABS Take  by mouth every day.      Cholecalciferol (VITAMIN D) 1000 UNIT CAPS Take  by mouth every day.       No current [facility]-ordered facility-administered medications on file.       Allergies:  Amlodipine, Ampicillin, and Penicillins    Health Maintenance: Completed    ROS:  Gen: no fevers/chills, no changes in weight  Eyes: no changes in vision  ENT: no sore throat, no hearing loss, no bloody nose  Pulm: no sob, no cough  CV: no chest pain, no palpitations  GI: no nausea/vomiting, no diarrhea  : no dysuria  MSk: no myalgias  Skin: no rash  Neuro: no headaches, no numbness/tingling  Heme/Lymph: no easy bruising      Objective:       Exam:  /72 (BP Location: Left arm, Patient Position: Sitting, BP Cuff Size: Adult)   Pulse 68   Temp 36.6 °C (97.8 °F) (Temporal)   Resp 18  "  Ht 1.651 m (5' 5\")   Wt 71.7 kg (158 lb)   SpO2 97%   BMI 26.29 kg/m²  Body mass index is 26.29 kg/m².    Gen: Alert and oriented, No apparent distress.  Neck: Neck is supple without lymphadenopathy.  Lungs: Normal effort, CTA bilaterally, no wheezes, rhonchi, or rales  CV: Regular rate and rhythm. No murmurs, rubs, or gallops.  Ext: No clubbing, cyanosis, edema.  Patient does have swelling to the PIP joint fifth finger left hand.  There is no redness.  No tenderness on palpation.  She does have a positive Tinel's sign to the left wrist.  Muscle strength in the hands appear symmetric: Only slightly diminished.      Labs: Reviewed and labs pending from cardiologist    Assessment & Plan:     83 y.o. female with the following -     1. Swelling of finger joint of left hand  This is likely a chronic issue.  Will go ahead and get an x-ray.  It may be swelling from arthritis or a synovial cyst.  - DX-FINGER(S) 2+ LEFT; Future    2. Encounter for screening mammogram for malignant neoplasm of breast  This is a screening issue.  Mammogram ordered.  - MA-SCREENING MAMMO BILAT W/TOMOSYNTHESIS W/CAD; Future    3. Postmenopausal estrogen deficiency  This is a screening issue.  DEXA scan ordered.  History for osteoporosis and off of Boniva due to possible bone loss around her teeth.  - DS-BONE DENSITY STUDY (DEXA); Future    4. Paresthesia of arm  This is a likely chronic problem.  Refer her for EMG testing to see if she has nerve impingement to the cervical spine running down her arm as well as possible left carpal tunnel syndrome.  - Referral to Neurodiagnostics (EEG,EP,EMG/NCS/DBS)    5. Essential hypertension, benign  This is a chronic stable condition.  Continue to follow.    6. Primary biliary cholangitis (HCC)  This is a chronic problem.  Continue care through gastroenterology.    She was told she also has osteoarthritis involving multiple joints.  She is told she can take a maximum of 3 g of Tylenol a day but be " cautious with that due to her liver issues.  Also since her GFR and kidney test are back to normal she could try Aleve but if she does not on a regular basis we need to recheck her renal tests periodically.  Return in about 6 months (around 9/22/2024) for Long.    Please note that this dictation was created using voice recognition software. I have made every reasonable attempt to correct obvious errors, but I expect that there are errors of grammar and possibly content that I did not discover before finalizing the note.

## 2024-03-27 ENCOUNTER — HOSPITAL ENCOUNTER (OUTPATIENT)
Dept: RADIOLOGY | Facility: MEDICAL CENTER | Age: 84
End: 2024-03-27
Attending: FAMILY MEDICINE
Payer: MEDICARE

## 2024-03-27 DIAGNOSIS — Z78.0 POSTMENOPAUSAL ESTROGEN DEFICIENCY: ICD-10-CM

## 2024-03-27 DIAGNOSIS — Z12.31 ENCOUNTER FOR SCREENING MAMMOGRAM FOR MALIGNANT NEOPLASM OF BREAST: ICD-10-CM

## 2024-03-27 PROCEDURE — 77080 DXA BONE DENSITY AXIAL: CPT

## 2024-03-27 PROCEDURE — 77067 SCR MAMMO BI INCL CAD: CPT

## 2024-03-28 DIAGNOSIS — M15.9 PRIMARY OSTEOARTHRITIS INVOLVING MULTIPLE JOINTS: ICD-10-CM

## 2024-04-03 ENCOUNTER — HOSPITAL ENCOUNTER (OUTPATIENT)
Dept: RADIOLOGY | Facility: MEDICAL CENTER | Age: 84
End: 2024-04-03
Attending: FAMILY MEDICINE
Payer: COMMERCIAL

## 2024-04-03 DIAGNOSIS — M25.442 SWELLING OF FINGER JOINT OF LEFT HAND: ICD-10-CM

## 2024-04-03 PROCEDURE — 73140 X-RAY EXAM OF FINGER(S): CPT | Mod: LT

## 2024-04-05 RX ORDER — ESOMEPRAZOLE MAGNESIUM 40 MG/1
40 CAPSULE, DELAYED RELEASE ORAL DAILY
Qty: 90 CAPSULE | Refills: 1 | Status: SHIPPED | OUTPATIENT
Start: 2024-04-05 | End: 2024-04-23 | Stop reason: SDUPTHER

## 2024-04-05 NOTE — TELEPHONE ENCOUNTER
Received request via: Patient    Was the patient seen in the last year in this department? Yes    Does the patient have an active prescription (recently filled or refills available) for medication(s) requested? No    Pharmacy Name: [facility]    Does the patient have [facility]FP Plus and need 100 day supply (blood pressure, diabetes and cholesterol meds only)? Patient does not have SCP

## 2024-04-09 ENCOUNTER — NON-PROVIDER VISIT (OUTPATIENT)
Dept: NEUROLOGY | Facility: MEDICAL CENTER | Age: 84
End: 2024-04-09
Attending: SPECIALIST
Payer: COMMERCIAL

## 2024-04-09 DIAGNOSIS — R20.2 PARESTHESIA OF ARM: ICD-10-CM

## 2024-04-09 PROCEDURE — 95908 NRV CNDJ TST 3-4 STUDIES: CPT | Performed by: SPECIALIST

## 2024-04-09 PROCEDURE — 95908 NRV CNDJ TST 3-4 STUDIES: CPT | Mod: 26 | Performed by: SPECIALIST

## 2024-04-09 PROCEDURE — 95886 MUSC TEST DONE W/N TEST COMP: CPT | Mod: 26 | Performed by: SPECIALIST

## 2024-04-09 PROCEDURE — 95886 MUSC TEST DONE W/N TEST COMP: CPT | Performed by: SPECIALIST

## 2024-04-09 NOTE — PROCEDURES
NERVE CONDUCTION STUDIES AND ELECTROMYOGRAPHY REPORT        04/09/24      Referring provider: Dav Wheeler III, M.D.      SUMMARY OF PATIENT'S CLINICAL HISTORY,PHYSICAL EXAM, AND RATIONALE FOR TESTING:    Ms. Amber Roach 83 y.o. presenting with left hand numbness and pain occurring in particular at night.    Past Medical History is significant for :   Past Medical History:   Diagnosis Date    Abnormal blood chemistry 2/8/2012    Abnormal electrocardiogram 2/8/2012    Breast cancer (HCC) 2/8/2012    Hyperlipidemia 2/8/2012    Hypertension 2/8/2012    Macular degeneration of right eye 2017    Paroxysmal atrial fibrillation (HCC)     Vitamin d deficiency 2/8/2012       A brief general examination was remarkable for thenar atrophy on the left.    The electrodiagnostic studies were performed to evaluate for possible peripheral neuropathy versus radiculopathy.      ELECTRODIAGNOSTIC EXAMINATION:  Nerve conduction studies (NCS) and electromyography (EMG) are utilized to evaluate direct or indirect damage to the peripheral nervous system. NCS are performed to measure the nerve(s) response(s) to electrostimulation across a given nerve segment. EMG evaluates the passive and active electrical activity of the muscle(s) in question.  Muscles are innervated by specific peripheral nerves and roots. Often times, several nerves the muscle to be examined in order to determine the presence or absence of the disease process. Furthermore, nerves and muscles may need to be tested in a [id number] comparison, as well as in additional extremities, as this may be crucial in characterizing the extent of the disease process, which may be diffuse or isolated and of varying degree of severity. The extent of the neurodiagnostic exam is justified as it may help arrive to a proper diagnosis, which ultimately may contribute to better management of the patient. Therefore, the nerves to muscles examined during the study were medically  necessary.    Unless otherwise noted, temperature of the extremity(s) study was monitored before and during the examination and remained between 32 and 36 degrees C for the upper extremities, and between 30 and 36 degrees C for the lower extremities.      NERVE CONDUCTION STUDIES:  Sensory nerves:   -Left median sensory nerve was examined.The response was not elicitable with antidromic stimuli.  -Left ulnar sensory nerve was examined. The response was within normal limits.      Motor nerves:   -Left median motor nerve was examined. Recording electrodes placed at the Abductor Pollicis Brevis muscles. The response was abnormal.  Onset latency was prolonged at 4.4 ms, amplitude was decreased to 0.6 mV and conduction velocity was within normal limits.  The response was dispersed and attenuated.  -Left ulnar motor nerve was examined. Recording electrodes placed at the Abductor Digiti Minimi muscles. The response was within normal limits.    ELECTROMYOGRAPHY:  The study was performed the concentric needle electrode. Fibrillation and fasciculation activity is graded by convention from none (0) to continuous (4+).  Needle electrode examination was performed in the following muscles: Left deltoid, biceps, triceps, first dorsal interosseous, abductor pollicis brevis.  Acute denervation changes with fibrillation potentials as well as chronic neuropathic changes with decreased recruitment of increased amplitude potentials were noted in the abductor pollicis brevis muscle.  Other muscles studied were normal electrophysiologically.      Nerve Conduction Studies     Stim Site NR Onset (ms) Norm Onset (ms) O-P Amp (µV) Norm O-P Amp Site1 Site2 Delta-P (ms) Dist (cm) Cm (m/s) Norm Cm (m/s)   Left Median Anti Sensory (2nd Digit)   Wrist *NR  <3.8  >10 Wrist 2nd Digit  14.0  >50   Left Ulnar Anti Sensory (5th Digit)   Wrist    2.8 <3.2 14.7 >5 Wrist 5th Digit 4.0 14.0 *35 >50        Stim Site NR Onset (ms) Norm Onset (ms) O-P Amp  (mV) Norm O-P Amp Site1 Site2 Delta-0 (ms) Dist (cm) Cm (m/s) Norm Cm (m/s)   Left Median Motor (Abd Poll Brev)   Wrist    *4.4 <4 *0.6 >5 Elbow Wrist 4.5 24.0 53 >50   Elbow    8.9  0.6          Left Ulnar Motor (Abd Dig Min)   Wrist    1.9 <3.1 8.6 >7 B Elbow Wrist 3.7 19.0 51 >50   B Elbow    5.6  7.0  A Elbow B Elbow 1.7 10.0 59    A Elbow    7.3  7.1                        Electromyography     Side Muscle Nerve Root Ins Act Fibs Psw Amp Dur Poly Recrt Int Pat Comment   Left Deltoid Axillary C5-6 Nml Nml Nml Nml Nml 0 Nml Nml    Left Biceps Musculocut C5-6 Nml Nml Nml Nml Nml 0 Nml Nml    Left Triceps Radial C6-7-8 Nml Nml Nml Nml Nml 0 Nml Nml    Left 1stDorInt Ulnar C8-T1 Nml Nml Nml Nml Nml 0 Nml Nml    Left Abd Poll Brev Median C8-T1 *Incr *1+ Nml *Incr Nml 0 *Reduced *50%            DIAGNOSTIC INTERPRETATION:   Extensive electrodiagnostic studies were performed to the left upper extremity.  The results are as follows:    1.  Left carpal tunnel syndrome which is severe electrophysiologically and associated with both acute and chronic denervation changes in median nerve supplied hand muscles.    2.  Normal left ulnar nerve motor and sensory conduction studies.    3.  No evidence of radiculopathy in selected muscles studied left upper extremity.        ANITHA Rogel M.D.(No note.)

## 2024-04-12 ENCOUNTER — HOSPITAL ENCOUNTER (OUTPATIENT)
Dept: RADIOLOGY | Facility: MEDICAL CENTER | Age: 84
End: 2024-04-12
Attending: FAMILY MEDICINE
Payer: COMMERCIAL

## 2024-04-12 DIAGNOSIS — R92.8 ABNORMAL MAMMOGRAM: ICD-10-CM

## 2024-04-12 PROCEDURE — G0279 TOMOSYNTHESIS, MAMMO: HCPCS

## 2024-04-18 DIAGNOSIS — G56.02 CARPAL TUNNEL SYNDROME OF LEFT WRIST: ICD-10-CM

## 2024-04-23 DIAGNOSIS — K21.9 GASTROESOPHAGEAL REFLUX DISEASE WITHOUT ESOPHAGITIS: ICD-10-CM

## 2024-04-23 RX ORDER — ESOMEPRAZOLE MAGNESIUM 40 MG/1
40 CAPSULE, DELAYED RELEASE ORAL DAILY
Qty: 90 CAPSULE | Refills: 1 | Status: SHIPPED | OUTPATIENT
Start: 2024-04-23

## 2024-05-16 ENCOUNTER — APPOINTMENT (RX ONLY)
Dept: URBAN - METROPOLITAN AREA CLINIC 22 | Facility: CLINIC | Age: 84
Setting detail: DERMATOLOGY
End: 2024-05-16

## 2024-05-16 DIAGNOSIS — Z87.2 PERSONAL HISTORY OF DISEASES OF THE SKIN AND SUBCUTANEOUS TISSUE: ICD-10-CM

## 2024-05-16 DIAGNOSIS — D22 MELANOCYTIC NEVI: ICD-10-CM

## 2024-05-16 DIAGNOSIS — L82.1 OTHER SEBORRHEIC KERATOSIS: ICD-10-CM

## 2024-05-16 DIAGNOSIS — D18.0 HEMANGIOMA: ICD-10-CM

## 2024-05-16 DIAGNOSIS — L81.4 OTHER MELANIN HYPERPIGMENTATION: ICD-10-CM

## 2024-05-16 DIAGNOSIS — L57.0 ACTINIC KERATOSIS: ICD-10-CM

## 2024-05-16 DIAGNOSIS — Z71.89 OTHER SPECIFIED COUNSELING: ICD-10-CM

## 2024-05-16 PROBLEM — D18.01 HEMANGIOMA OF SKIN AND SUBCUTANEOUS TISSUE: Status: ACTIVE | Noted: 2024-05-16

## 2024-05-16 PROBLEM — D22.5 MELANOCYTIC NEVI OF TRUNK: Status: ACTIVE | Noted: 2024-05-16

## 2024-05-16 PROBLEM — D48.5 NEOPLASM OF UNCERTAIN BEHAVIOR OF SKIN: Status: ACTIVE | Noted: 2024-05-16

## 2024-05-16 PROCEDURE — ? DEFER

## 2024-05-16 PROCEDURE — ? SUNSCREEN RECOMMENDATIONS

## 2024-05-16 PROCEDURE — ? ADDITIONAL NOTES

## 2024-05-16 PROCEDURE — ? COUNSELING

## 2024-05-16 PROCEDURE — 99213 OFFICE O/P EST LOW 20 MIN: CPT

## 2024-05-16 ASSESSMENT — LOCATION SIMPLE DESCRIPTION DERM
LOCATION SIMPLE: LEFT PRETIBIAL REGION
LOCATION SIMPLE: ABDOMEN
LOCATION SIMPLE: LEFT UPPER BACK
LOCATION SIMPLE: LEFT EYEBROW
LOCATION SIMPLE: LEFT FOREARM
LOCATION SIMPLE: RIGHT UPPER BACK

## 2024-05-16 ASSESSMENT — LOCATION ZONE DERM
LOCATION ZONE: FACE
LOCATION ZONE: ARM
LOCATION ZONE: TRUNK
LOCATION ZONE: LEG

## 2024-05-16 ASSESSMENT — LOCATION DETAILED DESCRIPTION DERM
LOCATION DETAILED: RIGHT MID-UPPER BACK
LOCATION DETAILED: LEFT DISTAL DORSAL FOREARM
LOCATION DETAILED: LEFT LATERAL ABDOMEN
LOCATION DETAILED: LEFT PROXIMAL PRETIBIAL REGION
LOCATION DETAILED: LEFT PROXIMAL DORSAL FOREARM
LOCATION DETAILED: LEFT CENTRAL EYEBROW
LOCATION DETAILED: LEFT INFERIOR UPPER BACK

## 2024-05-16 NOTE — PROCEDURE: ADDITIONAL NOTES
Render Risk Assessment In Note?: no
Additional Notes: Recheck face at next visit.
Detail Level: Simple

## 2024-05-16 NOTE — PROCEDURE: DEFER
Size Of Lesion In Cm (Optional): 0
Introduction Text (Please End With A Colon): .
Procedure To Be Performed At Next Visit: Biopsy by shave method
Detail Level: Detailed
Procedure To Be Performed At Next Visit: Cryotherapy

## 2024-06-19 ENCOUNTER — HOSPITAL ENCOUNTER (OUTPATIENT)
Dept: CARDIOLOGY | Facility: MEDICAL CENTER | Age: 84
End: 2024-06-19
Attending: ANESTHESIOLOGY
Payer: COMMERCIAL

## 2024-06-19 LAB — EKG IMPRESSION: NORMAL

## 2024-06-19 PROCEDURE — 93010 ELECTROCARDIOGRAM REPORT: CPT | Performed by: STUDENT IN AN ORGANIZED HEALTH CARE EDUCATION/TRAINING PROGRAM

## 2024-06-19 PROCEDURE — 93005 ELECTROCARDIOGRAM TRACING: CPT | Performed by: ANESTHESIOLOGY

## 2024-07-12 ENCOUNTER — HOSPITAL ENCOUNTER (OUTPATIENT)
Dept: LAB | Facility: MEDICAL CENTER | Age: 84
End: 2024-07-12
Attending: ANESTHESIOLOGY
Payer: COMMERCIAL

## 2024-07-12 LAB
ANION GAP SERPL CALC-SCNC: 13 MMOL/L (ref 7–16)
BASOPHILS # BLD AUTO: 0.9 % (ref 0–1.8)
BASOPHILS # BLD: 0.07 K/UL (ref 0–0.12)
BUN SERPL-MCNC: 17 MG/DL (ref 8–22)
CALCIUM SERPL-MCNC: 9.2 MG/DL (ref 8.5–10.5)
CHLORIDE SERPL-SCNC: 96 MMOL/L (ref 96–112)
CO2 SERPL-SCNC: 21 MMOL/L (ref 20–33)
CREAT SERPL-MCNC: 0.86 MG/DL (ref 0.5–1.4)
EOSINOPHIL # BLD AUTO: 0.16 K/UL (ref 0–0.51)
EOSINOPHIL NFR BLD: 2 % (ref 0–6.9)
ERYTHROCYTE [DISTWIDTH] IN BLOOD BY AUTOMATED COUNT: 43.7 FL (ref 35.9–50)
GFR SERPLBLD CREATININE-BSD FMLA CKD-EPI: 67 ML/MIN/1.73 M 2
GLUCOSE SERPL-MCNC: 101 MG/DL (ref 65–99)
HCT VFR BLD AUTO: 40.7 % (ref 37–47)
HGB BLD-MCNC: 13.7 G/DL (ref 12–16)
IMM GRANULOCYTES # BLD AUTO: 0.02 K/UL (ref 0–0.11)
IMM GRANULOCYTES NFR BLD AUTO: 0.3 % (ref 0–0.9)
LYMPHOCYTES # BLD AUTO: 1.59 K/UL (ref 1–4.8)
LYMPHOCYTES NFR BLD: 20.1 % (ref 22–41)
MCH RBC QN AUTO: 31.4 PG (ref 27–33)
MCHC RBC AUTO-ENTMCNC: 33.7 G/DL (ref 32.2–35.5)
MCV RBC AUTO: 93.1 FL (ref 81.4–97.8)
MONOCYTES # BLD AUTO: 0.81 K/UL (ref 0–0.85)
MONOCYTES NFR BLD AUTO: 10.3 % (ref 0–13.4)
NEUTROPHILS # BLD AUTO: 5.25 K/UL (ref 1.82–7.42)
NEUTROPHILS NFR BLD: 66.4 % (ref 44–72)
NRBC # BLD AUTO: 0 K/UL
NRBC BLD-RTO: 0 /100 WBC (ref 0–0.2)
PLATELET # BLD AUTO: 245 K/UL (ref 164–446)
PMV BLD AUTO: 9.6 FL (ref 9–12.9)
POTASSIUM SERPL-SCNC: 4.3 MMOL/L (ref 3.6–5.5)
RBC # BLD AUTO: 4.37 M/UL (ref 4.2–5.4)
SODIUM SERPL-SCNC: 130 MMOL/L (ref 135–145)
WBC # BLD AUTO: 7.9 K/UL (ref 4.8–10.8)

## 2024-07-12 PROCEDURE — 36415 COLL VENOUS BLD VENIPUNCTURE: CPT

## 2024-07-12 PROCEDURE — 85025 COMPLETE CBC W/AUTO DIFF WBC: CPT

## 2024-07-12 PROCEDURE — 80048 BASIC METABOLIC PNL TOTAL CA: CPT

## 2024-09-05 ENCOUNTER — APPOINTMENT (OUTPATIENT)
Dept: MEDICAL GROUP | Facility: PHYSICIAN GROUP | Age: 84
End: 2024-09-05
Payer: COMMERCIAL

## 2024-09-05 VITALS
HEIGHT: 65 IN | WEIGHT: 158 LBS | BODY MASS INDEX: 26.33 KG/M2 | DIASTOLIC BLOOD PRESSURE: 80 MMHG | SYSTOLIC BLOOD PRESSURE: 150 MMHG | OXYGEN SATURATION: 97 % | HEART RATE: 65 BPM | RESPIRATION RATE: 22 BRPM | TEMPERATURE: 97.4 F

## 2024-09-05 DIAGNOSIS — G47.9 SLEEP DISTURBANCE: ICD-10-CM

## 2024-09-05 DIAGNOSIS — E87.1 HYPONATREMIA: ICD-10-CM

## 2024-09-05 DIAGNOSIS — I10 ESSENTIAL HYPERTENSION, BENIGN: ICD-10-CM

## 2024-09-05 PROBLEM — Z12.31 ENCOUNTER FOR SCREENING MAMMOGRAM FOR MALIGNANT NEOPLASM OF BREAST: Status: RESOLVED | Noted: 2024-03-22 | Resolved: 2024-09-05

## 2024-09-05 PROBLEM — M25.442 SWELLING OF FINGER JOINT OF LEFT HAND: Status: RESOLVED | Noted: 2024-03-22 | Resolved: 2024-09-05

## 2024-09-05 PROCEDURE — 3077F SYST BP >= 140 MM HG: CPT | Performed by: FAMILY MEDICINE

## 2024-09-05 PROCEDURE — 99213 OFFICE O/P EST LOW 20 MIN: CPT | Performed by: FAMILY MEDICINE

## 2024-09-05 PROCEDURE — 1170F FXNL STATUS ASSESSED: CPT | Performed by: FAMILY MEDICINE

## 2024-09-05 PROCEDURE — 3079F DIAST BP 80-89 MM HG: CPT | Performed by: FAMILY MEDICINE

## 2024-09-05 RX ORDER — HYDRALAZINE HYDROCHLORIDE 10 MG/1
10 TABLET, FILM COATED ORAL 3 TIMES DAILY
Qty: 270 TABLET | Refills: 1 | Status: SHIPPED | OUTPATIENT
Start: 2024-09-05

## 2024-09-05 ASSESSMENT — FIBROSIS 4 INDEX: FIB4 SCORE: 2.4

## 2024-09-05 NOTE — ASSESSMENT & PLAN NOTE
This is a chronic problem.  Recently patient has had some mild elevation of her blood pressure.  She has not been sleeping well but has no other etiology for the cause.  She is asking if her medications can be adjusted.  Her cardiologist had given her hydralazine to use when her blood pressure was elevated but she does not have any more of those medications.  Her valsartan is at the max dose.  At her Coreg is at the max dose.

## 2024-09-05 NOTE — ASSESSMENT & PLAN NOTE
This is a chronic problem that is exacerbated lately.  She states she has no trouble getting to sleep but after a few hours will wake up and has a hard time going back to sleep.  She is use some over-the-counter NyQuil which does seem to help but she is not sure if it is safe to do so.  Melatonin has not done much for her.  Does not want a prescription medication.

## 2024-09-05 NOTE — PROGRESS NOTES
Subjective:     CC: Here for couple of issues.    HPI:   Amber presents today with the following medical concerns:    Essential hypertension, benign  This is a chronic problem.  Recently patient has had some mild elevation of her blood pressure.  She has not been sleeping well but has no other etiology for the cause.  She is asking if her medications can be adjusted.  Her cardiologist had given her hydralazine to use when her blood pressure was elevated but she does not have any more of those medications.  Her valsartan is at the max dose.  At her Coreg is at the max dose.    Sleep disturbance  This is a chronic problem that is exacerbated lately.  She states she has no trouble getting to sleep but after a few hours will wake up and has a hard time going back to sleep.  She is use some over-the-counter NyQuil which does seem to help but she is not sure if it is safe to do so.  Melatonin has not done much for her.  Does not want a prescription medication.    Hyponatremia  This is a chronic problem.  I went over the results of her last lab test and it shows the persistent hyponatremia.  It is unchanged.  Will continue to follow.    Past Medical History:   Diagnosis Date    Abnormal blood chemistry 2/8/2012    Abnormal electrocardiogram 2/8/2012    Breast cancer (HCC) 2/8/2012    Hyperlipidemia 2/8/2012    Hypertension 2/8/2012    Macular degeneration of right eye 2017    Paroxysmal atrial fibrillation (HCC)     Vitamin d deficiency 2/8/2012       Social History     Tobacco Use    Smoking status: Never    Smokeless tobacco: Never   Vaping Use    Vaping status: Never Used   Substance Use Topics    Alcohol use: Yes     Alcohol/week: 0.6 oz     Types: 1 Glasses of wine per week     Comment: occ    Drug use: No       Current Outpatient Medications Ordered in Epic   Medication Sig Dispense Refill    hydrALAZINE (APRESOLINE) 10 MG Tab Take 1 Tablet by mouth 3 times a day. 270 Tablet 1    esomeprazole (NEXIUM) 40 MG  "delayed-release capsule Take 1 Capsule by mouth every day. 90 Capsule 1    furosemide (LASIX) 20 MG Tab Take 1 Tablet by mouth 1 time a day as needed (swelling). Complete labs to ensure further refills, thank you 90 Tablet 0    valsartan (DIOVAN) 320 MG tablet Take 1 Tablet by mouth every day. 90 Tablet 3    atorvastatin (LIPITOR) 20 MG Tab Take 1 Tablet by mouth every day. 100 Tablet 3    carvedilol (COREG) 25 MG Tab Take 1 po  Tablet 3    busPIRone (BUSPAR) 5 MG tablet Take 1 Tablet by mouth 3 times a day. As needed for anxiety 270 Tablet 3    chlorhexidine (PERIDEX) 0.12 % Solution SWISH 15CC BY MOUTH TWICE A DAY. SPIT DO NOT SWALLOW      triamcinolone acetonide (KENALOG) 0.1 % Cream APPLY TO AFFECTED AREAS ON BACK TWICE A DAY X 14 DAYS/MONTH AS NEEDED .      Calcium Carbonate-Vitamin D (CALCIUM 500 + D PO) Take  by mouth.      Multiple Vitamins-Minerals (PRESERVISION AREDS 2 PO) Take  by mouth.      Multiple Vitamin (MULTI-VITAMIN) TABS Take  by mouth every day.      Cholecalciferol (VITAMIN D) 1000 UNIT CAPS Take  by mouth every day.       No current [facility]-ordered facility-administered medications on file.       Allergies:  Amlodipine, Ampicillin, and Penicillins    Health Maintenance: Completed    ROS:  Gen: no fevers/chills, no changes in weight  Eyes: no changes in vision  ENT: no sore throat, no hearing loss, no bloody nose  Pulm: no sob, no cough  CV: no chest pain, no palpitations  GI: no nausea/vomiting, no diarrhea  : no dysuria  MSk: no myalgias  Skin: no rash  Neuro: no headaches, no numbness/tingling  Heme/Lymph: no easy bruising      Objective:       Exam:  BP (!) 150/80   Pulse 65   Temp 36.3 °C (97.4 °F) (Temporal)   Resp (!) 22   Ht 1.651 m (5' 5\")   Wt 71.7 kg (158 lb)   SpO2 97%   BMI 26.29 kg/m²  Body mass index is 26.29 kg/m².    Gen: Alert and oriented, No apparent distress.  Lungs: Normal effort,   Ext: No clubbing, cyanosis, edema.  Psych: Patient is alert and cooperative.  " No unusual thought Sami expressed.  Send attention was good.    Labs: Previous results reviewed.  Patient encouraged to do the labs ordered by her cardiologist.    Assessment & Plan:     83 y.o. female with the following -     1. Essential hypertension, benign  This is a chronic problem.  Blood pressure is mildly elevated today and by her history has been so the last several days.  Will renew her hydralazine and have her take it just twice a day for now.  She is leaving on a vacation on Friday and so this should work for her in the meantime.  When she gets back coming to the clinic to have a blood pressure check.  It might be elevated due to some mild anxiety over the upcoming trip or her poor sleep pattern.    2. Hyponatremia  This is a chronic stable condition.  Continue to monitor.    3. Sleep disturbance  This is a chronic problem.  I told her is okay to take the DayQuil on a as needed basis.  She will see if that improves her sleep pattern and then she can just use it as needed.      Return if symptoms worsen or fail to improve.    Please note that this dictation was created using voice recognition software. I have made every reasonable attempt to correct obvious errors, but I expect that there are errors of grammar and possibly content that I did not discover before finalizing the note.

## 2024-09-05 NOTE — ASSESSMENT & PLAN NOTE
This is a chronic problem.  I went over the results of her last lab test and it shows the persistent hyponatremia.  It is unchanged.  Will continue to follow.

## 2024-10-11 DIAGNOSIS — K21.9 GASTROESOPHAGEAL REFLUX DISEASE WITHOUT ESOPHAGITIS: ICD-10-CM

## 2024-10-11 RX ORDER — ESOMEPRAZOLE MAGNESIUM 40 MG/1
40 CAPSULE, DELAYED RELEASE ORAL DAILY
Qty: 90 CAPSULE | Refills: 3 | Status: SHIPPED | OUTPATIENT
Start: 2024-10-11

## 2024-10-15 ENCOUNTER — HOSPITAL ENCOUNTER (OUTPATIENT)
Dept: LAB | Facility: MEDICAL CENTER | Age: 84
End: 2024-10-15
Attending: INTERNAL MEDICINE
Payer: COMMERCIAL

## 2024-10-15 DIAGNOSIS — E78.5 DYSLIPIDEMIA: ICD-10-CM

## 2024-10-15 LAB
ALBUMIN SERPL BCP-MCNC: 3.9 G/DL (ref 3.2–4.9)
ALBUMIN/GLOB SERPL: 1.1 G/DL
ALP SERPL-CCNC: 143 U/L (ref 30–99)
ALT SERPL-CCNC: 21 U/L (ref 2–50)
ANION GAP SERPL CALC-SCNC: 10 MMOL/L (ref 7–16)
AST SERPL-CCNC: 33 U/L (ref 12–45)
BILIRUB SERPL-MCNC: 0.6 MG/DL (ref 0.1–1.5)
BUN SERPL-MCNC: 12 MG/DL (ref 8–22)
CALCIUM ALBUM COR SERPL-MCNC: 9.3 MG/DL (ref 8.5–10.5)
CALCIUM SERPL-MCNC: 9.2 MG/DL (ref 8.5–10.5)
CHLORIDE SERPL-SCNC: 100 MMOL/L (ref 96–112)
CHOLEST SERPL-MCNC: 168 MG/DL (ref 100–199)
CO2 SERPL-SCNC: 24 MMOL/L (ref 20–33)
CREAT SERPL-MCNC: 0.91 MG/DL (ref 0.5–1.4)
ERYTHROCYTE [DISTWIDTH] IN BLOOD BY AUTOMATED COUNT: 43.9 FL (ref 35.9–50)
GFR SERPLBLD CREATININE-BSD FMLA CKD-EPI: 62 ML/MIN/1.73 M 2
GLOBULIN SER CALC-MCNC: 3.5 G/DL (ref 1.9–3.5)
GLUCOSE SERPL-MCNC: 99 MG/DL (ref 65–99)
HCT VFR BLD AUTO: 39.7 % (ref 37–47)
HDLC SERPL-MCNC: 82 MG/DL
HGB BLD-MCNC: 13.2 G/DL (ref 12–16)
LDLC SERPL CALC-MCNC: 70 MG/DL
MCH RBC QN AUTO: 31.3 PG (ref 27–33)
MCHC RBC AUTO-ENTMCNC: 33.2 G/DL (ref 32.2–35.5)
MCV RBC AUTO: 94.1 FL (ref 81.4–97.8)
PLATELET # BLD AUTO: 236 K/UL (ref 164–446)
PMV BLD AUTO: 9.7 FL (ref 9–12.9)
POTASSIUM SERPL-SCNC: 4.2 MMOL/L (ref 3.6–5.5)
PROT SERPL-MCNC: 7.4 G/DL (ref 6–8.2)
RBC # BLD AUTO: 4.22 M/UL (ref 4.2–5.4)
SODIUM SERPL-SCNC: 134 MMOL/L (ref 135–145)
TRIGL SERPL-MCNC: 78 MG/DL (ref 0–149)
WBC # BLD AUTO: 5.8 K/UL (ref 4.8–10.8)

## 2024-10-15 PROCEDURE — 85027 COMPLETE CBC AUTOMATED: CPT

## 2024-10-15 PROCEDURE — 80061 LIPID PANEL: CPT

## 2024-10-15 PROCEDURE — 36415 COLL VENOUS BLD VENIPUNCTURE: CPT

## 2024-10-15 PROCEDURE — 80053 COMPREHEN METABOLIC PANEL: CPT

## 2024-10-16 ENCOUNTER — APPOINTMENT (RX ONLY)
Dept: URBAN - METROPOLITAN AREA CLINIC 22 | Facility: CLINIC | Age: 84
Setting detail: DERMATOLOGY
End: 2024-10-16

## 2024-10-16 DIAGNOSIS — L82.1 OTHER SEBORRHEIC KERATOSIS: ICD-10-CM

## 2024-10-16 DIAGNOSIS — L82.0 INFLAMED SEBORRHEIC KERATOSIS: ICD-10-CM

## 2024-10-16 DIAGNOSIS — L57.0 ACTINIC KERATOSIS: ICD-10-CM

## 2024-10-16 DIAGNOSIS — L81.4 OTHER MELANIN HYPERPIGMENTATION: ICD-10-CM

## 2024-10-16 DIAGNOSIS — Z87.2 PERSONAL HISTORY OF DISEASES OF THE SKIN AND SUBCUTANEOUS TISSUE: ICD-10-CM

## 2024-10-16 PROBLEM — D48.5 NEOPLASM OF UNCERTAIN BEHAVIOR OF SKIN: Status: ACTIVE | Noted: 2024-10-16

## 2024-10-16 PROCEDURE — 99213 OFFICE O/P EST LOW 20 MIN: CPT | Mod: 25

## 2024-10-16 PROCEDURE — ? SUNSCREEN RECOMMENDATIONS

## 2024-10-16 PROCEDURE — 17003 DESTRUCT PREMALG LES 2-14: CPT | Mod: 59

## 2024-10-16 PROCEDURE — 17000 DESTRUCT PREMALG LESION: CPT | Mod: 59

## 2024-10-16 PROCEDURE — 17110 DESTRUCTION B9 LES UP TO 14: CPT

## 2024-10-16 PROCEDURE — ? LIQUID NITROGEN

## 2024-10-16 PROCEDURE — ? COUNSELING

## 2024-10-16 PROCEDURE — ? ADDITIONAL NOTES

## 2024-10-16 ASSESSMENT — LOCATION SIMPLE DESCRIPTION DERM
LOCATION SIMPLE: LEFT FOREARM
LOCATION SIMPLE: LEFT FOREHEAD
LOCATION SIMPLE: LEFT NOSE
LOCATION SIMPLE: LEFT PRETIBIAL REGION
LOCATION SIMPLE: LEFT EYEBROW
LOCATION SIMPLE: NOSE
LOCATION SIMPLE: RIGHT CHEEK

## 2024-10-16 ASSESSMENT — LOCATION DETAILED DESCRIPTION DERM
LOCATION DETAILED: RIGHT INFERIOR CENTRAL MALAR CHEEK
LOCATION DETAILED: LEFT NASAL DORSUM
LOCATION DETAILED: LEFT PROXIMAL DORSAL FOREARM
LOCATION DETAILED: NASAL SUPRATIP
LOCATION DETAILED: LEFT CENTRAL EYEBROW
LOCATION DETAILED: LEFT NASAL SIDEWALL
LOCATION DETAILED: LEFT PROXIMAL PRETIBIAL REGION
LOCATION DETAILED: LEFT INFERIOR FOREHEAD

## 2024-10-16 ASSESSMENT — LOCATION ZONE DERM
LOCATION ZONE: ARM
LOCATION ZONE: NOSE
LOCATION ZONE: LEG
LOCATION ZONE: FACE

## 2024-10-16 NOTE — PROCEDURE: LIQUID NITROGEN
Render Note In Bullet Format When Appropriate: No
Show Applicator Variable?: Yes
Duration Of Freeze Thaw-Cycle (Seconds): 3
Detail Level: Detailed
Post-Care Instructions: I reviewed with the patient in detail post-care instructions. Patient is to wear sunprotection, and avoid picking at any of the treated lesions. Pt may apply Vaseline to crusted or scabbing areas.
Number Of Freeze-Thaw Cycles: 2 freeze-thaw cycles
Consent: The patient's consent was obtained including but not limited to risks of crusting, scabbing, blistering, scarring, darker or lighter pigmentary change, recurrence, incomplete removal and infection.
Spray Paint Text: The liquid nitrogen was applied to the skin utilizing a spray paint frosting technique.
Medical Necessity Information: It is in your best interest to select a reason for this procedure from the list below. All of these items fulfill various CMS LCD requirements except the new and changing color options.
Consent: The patient's mom’s consent was obtained including but not limited to risks of crusting, scabbing, blistering, scarring, darker or lighter pigmentary change, recurrence, incomplete removal and infection.
Medical Necessity Clause: This procedure was medically necessary because the lesions that were treated were:

## 2024-10-16 NOTE — PROCEDURE: ADDITIONAL NOTES
Render Risk Assessment In Note?: no
Additional Notes: NER - suspect this was an ISK but has improved/resolved.
Detail Level: Simple

## 2024-10-16 NOTE — PROCEDURE: SUNSCREEN RECOMMENDATIONS
General Sunscreen Counseling: I recommend a broad spectrum sunscreen with a SPF of 30 or higher.\\nPatient recommended to wear daily.
Products Recommended: CeraVe, Cetaphil, Vanicream -generally hypoallergenic brands preferred
Detail Level: Detailed

## 2024-10-29 ENCOUNTER — OFFICE VISIT (OUTPATIENT)
Dept: CARDIOLOGY | Facility: MEDICAL CENTER | Age: 84
End: 2024-10-29
Attending: INTERNAL MEDICINE
Payer: COMMERCIAL

## 2024-10-29 VITALS
OXYGEN SATURATION: 96 % | SYSTOLIC BLOOD PRESSURE: 140 MMHG | DIASTOLIC BLOOD PRESSURE: 88 MMHG | HEIGHT: 65 IN | RESPIRATION RATE: 16 BRPM | HEART RATE: 64 BPM | WEIGHT: 159 LBS | BODY MASS INDEX: 26.49 KG/M2

## 2024-10-29 DIAGNOSIS — I10 ESSENTIAL HYPERTENSION, BENIGN: ICD-10-CM

## 2024-10-29 DIAGNOSIS — I65.22 CAROTID STENOSIS, LEFT: ICD-10-CM

## 2024-10-29 DIAGNOSIS — I48.0 HYPERCOAGULABLE STATE DUE TO PAROXYSMAL ATRIAL FIBRILLATION (HCC): Chronic | ICD-10-CM

## 2024-10-29 DIAGNOSIS — E78.5 DYSLIPIDEMIA: ICD-10-CM

## 2024-10-29 DIAGNOSIS — I49.1 PAC (PREMATURE ATRIAL CONTRACTION): Chronic | ICD-10-CM

## 2024-10-29 DIAGNOSIS — D68.69 HYPERCOAGULABLE STATE DUE TO PAROXYSMAL ATRIAL FIBRILLATION (HCC): Chronic | ICD-10-CM

## 2024-10-29 DIAGNOSIS — I48.0 PAF (PAROXYSMAL ATRIAL FIBRILLATION) (HCC): ICD-10-CM

## 2024-10-29 PROCEDURE — 99213 OFFICE O/P EST LOW 20 MIN: CPT | Performed by: INTERNAL MEDICINE

## 2024-10-29 RX ORDER — HYDRALAZINE HYDROCHLORIDE 25 MG/1
25 TABLET, FILM COATED ORAL
Qty: 180 TABLET | Refills: 3 | Status: SHIPPED | OUTPATIENT
Start: 2024-10-29

## 2024-10-29 ASSESSMENT — FIBROSIS 4 INDEX: FIB4 SCORE: 2.53

## 2024-11-08 RX ORDER — ATORVASTATIN CALCIUM 20 MG/1
20 TABLET, FILM COATED ORAL DAILY
Qty: 90 TABLET | Refills: 3 | Status: SHIPPED | OUTPATIENT
Start: 2024-11-08

## 2024-11-09 NOTE — TELEPHONE ENCOUNTER
Received request via: Pharmacy    Was the patient seen in the last year in this department? Yes    Does the patient have an active prescription (recently filled or refills available) for medication(s) requested? No    Pharmacy Name: [facility]     Does the patient have assisted Plus and need 100-day supply? (This applies to ALL medications) Patient does not have SCP

## 2024-11-25 RX ORDER — VALSARTAN 320 MG/1
320 TABLET ORAL DAILY
Qty: 90 TABLET | Refills: 3 | Status: SHIPPED | OUTPATIENT
Start: 2024-11-25

## 2024-11-25 NOTE — TELEPHONE ENCOUNTER
Received request via: Pharmacy    Was the patient seen in the last year in this department? Yes    Does the patient have an active prescription (recently filled or refills available) for medication(s) requested? No    Pharmacy Name: [facility]    Does the patient have long term Plus and need 100-day supply? (This applies to ALL medications) Patient does not have SCP

## 2024-12-20 DIAGNOSIS — I10 ESSENTIAL HYPERTENSION, BENIGN: ICD-10-CM

## 2024-12-20 RX ORDER — HYDRALAZINE HYDROCHLORIDE 25 MG/1
25 TABLET, FILM COATED ORAL
Qty: 180 TABLET | Refills: 3 | Status: SHIPPED | OUTPATIENT
Start: 2024-12-20

## 2025-01-10 RX ORDER — CARVEDILOL 25 MG/1
TABLET ORAL
Qty: 180 TABLET | Refills: 2 | Status: SHIPPED | OUTPATIENT
Start: 2025-01-10

## 2025-01-10 NOTE — TELEPHONE ENCOUNTER
Received request via: Patient    Was the patient seen in the last year in this department? Yes    Does the patient have an active prescription (recently filled or refills available) for medication(s) requested? No    Pharmacy Name:  [facility] - [place], [place] - [facility] [address]     Does the patient have halfway Plus and need 100-day supply? (This applies to ALL medications) Patient does not have SCP

## 2025-04-10 ENCOUNTER — RESULTS FOLLOW-UP (OUTPATIENT)
Dept: MEDICAL GROUP | Facility: PHYSICIAN GROUP | Age: 85
End: 2025-04-10

## 2025-04-10 ENCOUNTER — HOSPITAL ENCOUNTER (OUTPATIENT)
Dept: RADIOLOGY | Facility: MEDICAL CENTER | Age: 85
End: 2025-04-10
Attending: FAMILY MEDICINE
Payer: COMMERCIAL

## 2025-04-10 DIAGNOSIS — Z12.31 VISIT FOR SCREENING MAMMOGRAM: ICD-10-CM

## 2025-04-10 PROCEDURE — 77067 SCR MAMMO BI INCL CAD: CPT

## 2025-04-16 ENCOUNTER — APPOINTMENT (OUTPATIENT)
Dept: URBAN - METROPOLITAN AREA CLINIC 22 | Facility: CLINIC | Age: 85
Setting detail: DERMATOLOGY
End: 2025-04-16

## 2025-04-16 DIAGNOSIS — D22 MELANOCYTIC NEVI: ICD-10-CM

## 2025-04-16 DIAGNOSIS — Z71.89 OTHER SPECIFIED COUNSELING: ICD-10-CM

## 2025-04-16 DIAGNOSIS — L82.1 OTHER SEBORRHEIC KERATOSIS: ICD-10-CM

## 2025-04-16 DIAGNOSIS — L81.4 OTHER MELANIN HYPERPIGMENTATION: ICD-10-CM

## 2025-04-16 DIAGNOSIS — Z87.2 PERSONAL HISTORY OF DISEASES OF THE SKIN AND SUBCUTANEOUS TISSUE: ICD-10-CM

## 2025-04-16 DIAGNOSIS — D18.0 HEMANGIOMA: ICD-10-CM

## 2025-04-16 DIAGNOSIS — L57.0 ACTINIC KERATOSIS: ICD-10-CM

## 2025-04-16 PROBLEM — D18.01 HEMANGIOMA OF SKIN AND SUBCUTANEOUS TISSUE: Status: ACTIVE | Noted: 2025-04-16

## 2025-04-16 PROBLEM — D22.5 MELANOCYTIC NEVI OF TRUNK: Status: ACTIVE | Noted: 2025-04-16

## 2025-04-16 PROCEDURE — ? COUNSELING

## 2025-04-16 PROCEDURE — 99213 OFFICE O/P EST LOW 20 MIN: CPT | Mod: 25

## 2025-04-16 PROCEDURE — 17003 DESTRUCT PREMALG LES 2-14: CPT

## 2025-04-16 PROCEDURE — 17000 DESTRUCT PREMALG LESION: CPT

## 2025-04-16 PROCEDURE — ? LIQUID NITROGEN

## 2025-04-16 PROCEDURE — ? ADDITIONAL NOTES

## 2025-04-16 PROCEDURE — ? SUNSCREEN RECOMMENDATIONS

## 2025-04-16 ASSESSMENT — LOCATION SIMPLE DESCRIPTION DERM
LOCATION SIMPLE: RIGHT UPPER BACK
LOCATION SIMPLE: LEFT EYEBROW
LOCATION SIMPLE: LEFT FOREARM
LOCATION SIMPLE: NOSE
LOCATION SIMPLE: LEFT PRETIBIAL REGION
LOCATION SIMPLE: ABDOMEN
LOCATION SIMPLE: RIGHT MIDDLE FINGER
LOCATION SIMPLE: LEFT CHEEK
LOCATION SIMPLE: LEFT UPPER BACK
LOCATION SIMPLE: RIGHT CHEEK

## 2025-04-16 ASSESSMENT — LOCATION DETAILED DESCRIPTION DERM
LOCATION DETAILED: RIGHT PROXIMAL DORSAL MIDDLE FINGER
LOCATION DETAILED: RIGHT SUPERIOR CENTRAL MALAR CHEEK
LOCATION DETAILED: RIGHT CENTRAL MALAR CHEEK
LOCATION DETAILED: NASAL DORSUM
LOCATION DETAILED: LEFT DISTAL DORSAL FOREARM
LOCATION DETAILED: LEFT LATERAL PROXIMAL PRETIBIAL REGION
LOCATION DETAILED: LEFT PROXIMAL DORSAL FOREARM
LOCATION DETAILED: LEFT INFERIOR CENTRAL MALAR CHEEK
LOCATION DETAILED: LEFT LATERAL ABDOMEN
LOCATION DETAILED: LEFT CENTRAL EYEBROW
LOCATION DETAILED: RIGHT MID-UPPER BACK
LOCATION DETAILED: LEFT PROXIMAL PRETIBIAL REGION
LOCATION DETAILED: RIGHT INFERIOR MEDIAL MALAR CHEEK
LOCATION DETAILED: LEFT INFERIOR UPPER BACK

## 2025-04-16 ASSESSMENT — LOCATION ZONE DERM
LOCATION ZONE: FACE
LOCATION ZONE: NOSE
LOCATION ZONE: LEG
LOCATION ZONE: FINGER
LOCATION ZONE: ARM
LOCATION ZONE: TRUNK

## 2025-04-16 NOTE — PROCEDURE: ADDITIONAL NOTES
Additional Notes: Will discuss field therapy at 6 month follow up in the Fall.
Detail Level: Simple
Render Risk Assessment In Note?: no

## 2025-07-24 ENCOUNTER — OFFICE VISIT (OUTPATIENT)
Dept: MEDICAL GROUP | Facility: PHYSICIAN GROUP | Age: 85
End: 2025-07-24
Payer: COMMERCIAL

## 2025-07-24 VITALS
BODY MASS INDEX: 26.99 KG/M2 | WEIGHT: 162 LBS | HEIGHT: 65 IN | TEMPERATURE: 98.2 F | HEART RATE: 68 BPM | OXYGEN SATURATION: 98 % | SYSTOLIC BLOOD PRESSURE: 142 MMHG | RESPIRATION RATE: 16 BRPM | DIASTOLIC BLOOD PRESSURE: 80 MMHG

## 2025-07-24 DIAGNOSIS — I10 ESSENTIAL HYPERTENSION, BENIGN: Primary | ICD-10-CM

## 2025-07-24 DIAGNOSIS — E87.1 HYPONATREMIA: ICD-10-CM

## 2025-07-24 DIAGNOSIS — E78.5 DYSLIPIDEMIA: ICD-10-CM

## 2025-07-24 PROCEDURE — 99214 OFFICE O/P EST MOD 30 MIN: CPT | Performed by: FAMILY MEDICINE

## 2025-07-24 PROCEDURE — 1170F FXNL STATUS ASSESSED: CPT | Performed by: FAMILY MEDICINE

## 2025-07-24 PROCEDURE — 3079F DIAST BP 80-89 MM HG: CPT | Performed by: FAMILY MEDICINE

## 2025-07-24 PROCEDURE — 3077F SYST BP >= 140 MM HG: CPT | Performed by: FAMILY MEDICINE

## 2025-07-24 ASSESSMENT — PATIENT HEALTH QUESTIONNAIRE - PHQ9: CLINICAL INTERPRETATION OF PHQ2 SCORE: 0

## 2025-07-24 ASSESSMENT — FIBROSIS 4 INDEX: FIB4 SCORE: 2.56

## 2025-07-24 NOTE — ASSESSMENT & PLAN NOTE
This is a chronic problem.  Patient been having lots of dental issues lately.  She has had all of her teeth pulled and is waiting to get dentures fitted that will work for her.  Been may not be until October.'s results she has noted when she is in pain her blood pressure goes up.  The highest has been a systolic of 150 and diastolic of 91.  When she gets these numbers she gets very anxious and stressed which she thinks makes her blood pressure worse.  Today her reading is 142/80.

## 2025-07-24 NOTE — PROGRESS NOTES
"Subjective:     CC: Patient is here to discuss her hypertension and also would like to get her annual labs done.  Has some other questions as well.    HPI:   Amber presents today with following medical concerns:    Essential hypertension, benign  This is a chronic problem.  Patient been having lots of dental issues lately.  She has had all of her teeth pulled and is waiting to get dentures fitted that will work for her.  Been may not be until October.'s results she has noted when she is in pain her blood pressure goes up.  The highest has been a systolic of 150 and diastolic of 91.  When she gets these numbers she gets very anxious and stressed which she thinks makes her blood pressure worse.  Today her reading is 142/80.    Dyslipidemia  This is a chronic problem.  Lab will be ordered for follow-up.  She tries to eat a healthy diet.    Hyponatremia  This is a chronic problem.  Lab ordered for follow-up.    Past Medical History[1]    Social History[2]    Current Medications and Prescriptions Ordered in Epic[3]    Allergies:  Amlodipine, Ampicillin, and Penicillins    Health Maintenance: Completed    ROS:  Gen: no fevers/chills, no changes in weight  Eyes: no changes in vision  ENT: no sore throat, no hearing loss, no bloody nose  Pulm: no sob, no cough  CV: no chest pain, no palpitations  GI: no nausea/vomiting, no diarrhea  : no dysuria  MSk: no myalgias  Skin: no rash  Neuro: no headaches, no numbness/tingling  Heme/Lymph: no easy bruising      Objective:       Exam:  BP (!) 142/80 (BP Location: Left arm, Patient Position: Sitting, BP Cuff Size: Adult)   Pulse 68   Temp 36.8 °C (98.2 °F) (Temporal)   Resp 16   Ht 1.651 m (5' 5\")   Wt 73.5 kg (162 lb)   SpO2 98%   BMI 26.96 kg/m²  Body mass index is 26.96 kg/m².    Gen: Alert and oriented, No apparent distress.  Eyes:   Extraocular motions intact.  No scleral icterus seen.  Neck: Neck is supple without lymphadenopathy.  Thyroid exam is " normal.  Lungs: Normal effort, CTA bilaterally, no wheezes, rhonchi, or rales  CV: Regular rate and rhythm. No murmurs, rubs, or gallops.  No carotid bruits heard.  Abdomen: Soft, nontender, organomegaly or masses.  Ext: No clubbing, cyanosis, edema.  Neuro: Cranial nerves II through VIII are grossly intact.  No lateralized sensory seen.  Gait is normal.    Labs: Ordered    Assessment & Plan:     84 y.o. female with the following -     1. Essential hypertension, benign (Primary)  This is a chronic problem.  I told her ideally we would like her blood pressure 130/80 or less.  Will go ahead and had her increase the hydralazine to 25 mg 2 in the morning and 1 at night.  She should come in for recheck blood pressure in 1 to 2 weeks.  Continue on her other medications.  - Comp Metabolic Panel; Future  - Lipid Profile; Future  - URINALYSIS,CULTURE IF INDICATED; Future  - ESTIMATED GFR; Future  - CBC WITH DIFFERENTIAL; Future    2. Dyslipidemia  This is a chronic problem.  Lab ordered for follow-up.  - Comp Metabolic Panel; Future  - Lipid Profile; Future    3. Hyponatremia  This is a chronic problem.  Lab ordered.  - Comp Metabolic Panel; Future      Return in about 1 year (around 7/24/2026) for annual exam.    Please note that this dictation was created using voice recognition software. I have made every reasonable attempt to correct obvious errors, but I expect that there are errors of grammar and possibly content that I did not discover before finalizing the note.             [1]   Past Medical History:  Diagnosis Date    Abnormal blood chemistry 2/8/2012    Abnormal electrocardiogram 2/8/2012    Breast cancer (HCC) 2/8/2012    Hyperlipidemia 2/8/2012    Hypertension 2/8/2012    Macular degeneration of right eye 2017    Paroxysmal atrial fibrillation (HCC)     Vitamin d deficiency 2/8/2012   [2]   Social History  Tobacco Use    Smoking status: Never    Smokeless tobacco: Never   Vaping Use    Vaping status: Never Used    Substance Use Topics    Alcohol use: Yes     Alcohol/week: 0.6 oz     Types: 1 Glasses of wine per week     Comment: occ    Drug use: No   [3]   Current Outpatient Medications Ordered in Epic   Medication Sig Dispense Refill    carvedilol (COREG) 25 MG Tab Take 1 po  Tablet 2    hydrALAZINE (APRESOLINE) 25 MG Tab Take 1 Tablet by mouth 2 times a day. 180 Tablet 3    valsartan (DIOVAN) 320 MG tablet Take 1 Tablet by mouth every day. 90 Tablet 3    atorvastatin (LIPITOR) 20 MG Tab Take 1 Tablet by mouth every day. 90 Tablet 3    esomeprazole (NEXIUM) 40 MG delayed-release capsule Take 1 Capsule by mouth every day. 90 Capsule 3    furosemide (LASIX) 20 MG Tab Take 1 Tablet by mouth 1 time a day as needed (swelling). Complete labs to ensure further refills, thank you 90 Tablet 0    busPIRone (BUSPAR) 5 MG tablet Take 1 Tablet by mouth 3 times a day. As needed for anxiety 270 Tablet 3    chlorhexidine (PERIDEX) 0.12 % Solution SWISH 15CC BY MOUTH TWICE A DAY. SPIT DO NOT SWALLOW      triamcinolone acetonide (KENALOG) 0.1 % Cream APPLY TO AFFECTED AREAS ON BACK TWICE A DAY X 14 DAYS/MONTH AS NEEDED .      Calcium Carbonate-Vitamin D (CALCIUM 500 + D PO) Take  by mouth.      Multiple Vitamins-Minerals (PRESERVISION AREDS 2 PO) Take  by mouth.      Multiple Vitamin (MULTI-VITAMIN) TABS Take  by mouth every day.      Cholecalciferol (VITAMIN D) 1000 UNIT CAPS Take  by mouth every day.       No current [facility]-ordered facility-administered medications on file.

## 2025-07-26 ENCOUNTER — HOSPITAL ENCOUNTER (OUTPATIENT)
Dept: LAB | Facility: MEDICAL CENTER | Age: 85
End: 2025-07-26
Attending: FAMILY MEDICINE
Payer: COMMERCIAL

## 2025-07-26 DIAGNOSIS — I10 ESSENTIAL HYPERTENSION, BENIGN: ICD-10-CM

## 2025-07-26 DIAGNOSIS — E87.1 HYPONATREMIA: ICD-10-CM

## 2025-07-26 DIAGNOSIS — E78.5 DYSLIPIDEMIA: ICD-10-CM

## 2025-07-26 LAB
ALBUMIN SERPL BCP-MCNC: 4.2 G/DL (ref 3.2–4.9)
ALBUMIN/GLOB SERPL: 1.2 G/DL
ALP SERPL-CCNC: 110 U/L (ref 30–99)
ALT SERPL-CCNC: 19 U/L (ref 2–50)
ANION GAP SERPL CALC-SCNC: 12 MMOL/L (ref 7–16)
APPEARANCE UR: CLEAR
AST SERPL-CCNC: 25 U/L (ref 12–45)
BASOPHILS # BLD AUTO: 1.4 % (ref 0–1.8)
BASOPHILS # BLD: 0.09 K/UL (ref 0–0.12)
BILIRUB SERPL-MCNC: 0.7 MG/DL (ref 0.1–1.5)
BILIRUB UR QL STRIP.AUTO: NEGATIVE
BUN SERPL-MCNC: 14 MG/DL (ref 8–22)
CALCIUM ALBUM COR SERPL-MCNC: 9.5 MG/DL (ref 8.5–10.5)
CALCIUM SERPL-MCNC: 9.7 MG/DL (ref 8.5–10.5)
CHLORIDE SERPL-SCNC: 96 MMOL/L (ref 96–112)
CHOLEST SERPL-MCNC: 175 MG/DL (ref 100–199)
CO2 SERPL-SCNC: 23 MMOL/L (ref 20–33)
COLOR UR: YELLOW
CREAT SERPL-MCNC: 0.87 MG/DL (ref 0.5–1.4)
EOSINOPHIL # BLD AUTO: 0.21 K/UL (ref 0–0.51)
EOSINOPHIL NFR BLD: 3.3 % (ref 0–6.9)
ERYTHROCYTE [DISTWIDTH] IN BLOOD BY AUTOMATED COUNT: 43.5 FL (ref 35.9–50)
FASTING STATUS PATIENT QL REPORTED: NORMAL
GFR SERPLBLD CREATININE-BSD FMLA CKD-EPI: 65 ML/MIN/1.73 M 2
GLOBULIN SER CALC-MCNC: 3.5 G/DL (ref 1.9–3.5)
GLUCOSE SERPL-MCNC: 110 MG/DL (ref 65–99)
GLUCOSE UR STRIP.AUTO-MCNC: NEGATIVE MG/DL
HCT VFR BLD AUTO: 41.3 % (ref 37–47)
HDLC SERPL-MCNC: 86 MG/DL
HGB BLD-MCNC: 13.8 G/DL (ref 12–16)
IMM GRANULOCYTES # BLD AUTO: 0.02 K/UL (ref 0–0.11)
IMM GRANULOCYTES NFR BLD AUTO: 0.3 % (ref 0–0.9)
KETONES UR STRIP.AUTO-MCNC: NEGATIVE MG/DL
LDLC SERPL CALC-MCNC: 72 MG/DL
LEUKOCYTE ESTERASE UR QL STRIP.AUTO: NEGATIVE
LYMPHOCYTES # BLD AUTO: 1.23 K/UL (ref 1–4.8)
LYMPHOCYTES NFR BLD: 19.6 % (ref 22–41)
MCH RBC QN AUTO: 31.4 PG (ref 27–33)
MCHC RBC AUTO-ENTMCNC: 33.4 G/DL (ref 32.2–35.5)
MCV RBC AUTO: 94.1 FL (ref 81.4–97.8)
MICRO URNS: NORMAL
MONOCYTES # BLD AUTO: 0.55 K/UL (ref 0–0.85)
MONOCYTES NFR BLD AUTO: 8.8 % (ref 0–13.4)
NEUTROPHILS # BLD AUTO: 4.17 K/UL (ref 1.82–7.42)
NEUTROPHILS NFR BLD: 66.6 % (ref 44–72)
NITRITE UR QL STRIP.AUTO: NEGATIVE
NRBC # BLD AUTO: 0 K/UL
NRBC BLD-RTO: 0 /100 WBC (ref 0–0.2)
PH UR STRIP.AUTO: 7 [PH] (ref 5–8)
PLATELET # BLD AUTO: 225 K/UL (ref 164–446)
PMV BLD AUTO: 9.8 FL (ref 9–12.9)
POTASSIUM SERPL-SCNC: 4.2 MMOL/L (ref 3.6–5.5)
PROT SERPL-MCNC: 7.7 G/DL (ref 6–8.2)
PROT UR QL STRIP: NEGATIVE MG/DL
RBC # BLD AUTO: 4.39 M/UL (ref 4.2–5.4)
RBC UR QL AUTO: NEGATIVE
SODIUM SERPL-SCNC: 131 MMOL/L (ref 135–145)
SP GR UR STRIP.AUTO: 1.01
TRIGL SERPL-MCNC: 83 MG/DL (ref 0–149)
UROBILINOGEN UR STRIP.AUTO-MCNC: 0.2 EU/DL
WBC # BLD AUTO: 6.3 K/UL (ref 4.8–10.8)

## 2025-07-26 PROCEDURE — 36415 COLL VENOUS BLD VENIPUNCTURE: CPT

## 2025-07-26 PROCEDURE — 81003 URINALYSIS AUTO W/O SCOPE: CPT

## 2025-07-26 PROCEDURE — 85025 COMPLETE CBC W/AUTO DIFF WBC: CPT

## 2025-07-26 PROCEDURE — 80053 COMPREHEN METABOLIC PANEL: CPT

## 2025-07-26 PROCEDURE — 80061 LIPID PANEL: CPT
